# Patient Record
Sex: MALE | Race: WHITE | NOT HISPANIC OR LATINO | Employment: OTHER | URBAN - NONMETROPOLITAN AREA
[De-identification: names, ages, dates, MRNs, and addresses within clinical notes are randomized per-mention and may not be internally consistent; named-entity substitution may affect disease eponyms.]

---

## 2018-08-03 ENCOUNTER — HOSPITAL ENCOUNTER (EMERGENCY)
Facility: OTHER | Age: 28
Discharge: HOME | End: 2018-08-03
Attending: EMERGENCY MEDICINE | Admitting: EMERGENCY MEDICINE
Payer: OTHER GOVERNMENT

## 2018-08-03 VITALS
RESPIRATION RATE: 26 BRPM | DIASTOLIC BLOOD PRESSURE: 82 MMHG | HEIGHT: 73 IN | OXYGEN SATURATION: 98 % | SYSTOLIC BLOOD PRESSURE: 117 MMHG | TEMPERATURE: 97.8 F

## 2018-08-03 DIAGNOSIS — R00.2 PALPITATIONS: ICD-10-CM

## 2018-08-03 DIAGNOSIS — K08.89 PAIN, DENTAL: ICD-10-CM

## 2018-08-03 PROCEDURE — 93010 ELECTROCARDIOGRAM REPORT: CPT | Performed by: INTERNAL MEDICINE

## 2018-08-03 PROCEDURE — 93005 ELECTROCARDIOGRAM TRACING: CPT | Performed by: EMERGENCY MEDICINE

## 2018-08-03 PROCEDURE — 99283 EMERGENCY DEPT VISIT LOW MDM: CPT | Mod: 25 | Performed by: EMERGENCY MEDICINE

## 2018-08-03 PROCEDURE — 99283 EMERGENCY DEPT VISIT LOW MDM: CPT | Mod: Z6 | Performed by: EMERGENCY MEDICINE

## 2018-08-03 RX ORDER — CLINDAMYCIN HCL 300 MG
300 CAPSULE ORAL 3 TIMES DAILY
Qty: 30 CAPSULE | Refills: 0 | Status: SHIPPED | OUTPATIENT
Start: 2018-08-03 | End: 2018-08-13

## 2018-08-03 ASSESSMENT — ENCOUNTER SYMPTOMS
DIARRHEA: 0
DIZZINESS: 0
NAUSEA: 0
HEADACHES: 0
ABDOMINAL PAIN: 0
FEVER: 0
WEAKNESS: 0
CHILLS: 0
PALPITATIONS: 1
SHORTNESS OF BREATH: 0
VOMITING: 0
DIAPHORESIS: 0
FATIGUE: 0
LIGHT-HEADEDNESS: 0

## 2018-08-03 NOTE — ED AVS SNAPSHOT
[facility]    [address]    [place] [address]    Phone:  [phone]    Fax:  [phone]                                       Anton Donovan   MRN: [id number]    Department:  [facility]   Date of Visit:  8/3/2018           Patient Information     Date Of Birth          1990        Your diagnoses for this visit were:     Palpitations     Pain, dental        You were seen by Quinn Wilson MD.        Discharge Instructions       1.  Take clindamycin as instructed  2.  Follow-up your own dentist either later today or Monday for further care and recommendation  3.  Take ibuprofen 600-800 mg 2-3 times a day as needed for pain  4.  You may also take Tylenol 1000 mg 3-4 times a day as needed for pain    24 Hour Appointment Hotline     To schedule an appointment at [facility], please call [phone]. If you don't have a family doctor or clinic, we will help you find one. [facility] are conveniently located to serve the needs of you and your family.           Review of your medicines      START taking        Dose / Directions Last dose taken    clindamycin 300 MG capsule   Commonly known as:  CLEOCIN   Dose:  300 mg   Quantity:  30 capsule        Take 1 capsule (300 mg) by mouth 3 times daily for 10 days   Refills:  0          Our records show that you are taking the medicines listed below. If these are incorrect, please call your family doctor or clinic.        Dose / Directions Last dose taken    GABAPENTIN PO   Dose:  300 mg        Take 300 mg by mouth 3 times daily   Refills:  0                Prescriptions were sent or printed at these locations (1 Prescription)                   [id number] - [place], [place] - [address]   [address], [facility] [address]    Telephone:  [phone]   Fax:  [phone]   Hours:                  Printed at Department/Unit printer (1 of 1)          "clindamycin (CLEOCIN) 300 MG capsule                Procedures and tests performed during your visit     EKG 12 lead      Orders Needing Specimen Collection     None      Pending Results     No orders found from 2018 to 2018.            Pending Culture Results     No orders found from 2018 to 2018.            Pending Results Instructions     If you had any lab results that were not finalized at the time of your Discharge, you can call the ED Lab Result RN at [phone]. You will be contacted by this team for any positive Lab results or changes in treatment. The nurses are available 7 days a week from 10A to 6:30P.  You can leave a message 24 hours per day and they will return your call.        Thank you for choosing [facility]       Thank you for choosing [facility] for your care. Our goal is always to provide you with excellent care. Hearing back from our patients is one way we can continue to improve our services. Please take a few minutes to complete the written survey that you may receive in the mail after you visit with us. Thank you!        [facility] [employer] Information     [employer] lets you send messages to your doctor, view your test results, renew your prescriptions, schedule appointments and more. To sign up, go to [email or web address] . Click on \"Log in\" on the left side of the screen, which will take you to the Welcome page. Then click on \"Sign up Now\" on the right side of the page.     You will be asked to enter the access code listed below, as well as some personal information. Please follow the directions to create your username and password.     Your access code is: [email or web address]  Expires: 2018  9:17 AM     Your access code will  in 90 days. If you need help or a new code, please call your [facility] clinic or [phone].        Care EveryWhere ID     This is your Care EveryWhere ID. This could be used by other organizations to access your [facility] medical records  [id number]      "   Equal Access to Services     KATERINE DALTON : Eloy Sarmiento, janna mcginnis, ami bailey. So [facility] [phone].    ATENCIÓN: Si habla español, tiene a conde disposición servicios gratuitos de asistencia lingüística. Llame al [phone].    We comply with applicable federal civil rights laws and [place] laws. We do not discriminate on the basis of race, color, national origin, age, disability, sex, sexual orientation, or gender identity.            After Visit Summary       This is your record. Keep this with you and show to your community pharmacist(s) and doctor(s) at your next visit.

## 2018-08-03 NOTE — ED PROVIDER NOTES
"  History     Chief Complaint   Patient presents with     Dental Pain     Arm Pain     Palpitations     HPI Comments: Is a 27-year-old with history of one episode of rapid heart rate with poor ventricular rate of 220s with syncope in 2013 at work for which the patient was admitted to the hospital in [facility] for 5 days coming to the emergency room with complaint of 3 episodes of what he feels to have been a skipped beat of his heart in the past several days.  He said he has had skipped beats but the episodes was maybe once a year in the past.  He denied fatigue, rapid heart rate, dizziness/lightheadedness, faint sensation or loss of consciousness.  He has not had a shortness of breath or chest or abdominal pains.  Patient denies family history of sudden cardiac death or history of terminal arrhythmia.  He stated he was told he had that rapid heart rate episode in the [facility] that he had \"SVT\" and he was discharged home with propranolol.  He said he had taken it for a while and then stopped it because the prescription was not refilled and he was doing well and he did not pursue it any further.    Problem List:    There are no active problems to display for this patient.       Past Medical History:    No past medical history on file.    Past Surgical History:    No past surgical history on file.    Family History:    No family history on file.    Social History:  Marital Status:    Social History   Substance Use Topics     Smoking status: Not on file     Smokeless tobacco: Not on file     Alcohol use Not on file        Medications:      clindamycin (CLEOCIN) 300 MG capsule   GABAPENTIN PO         Review of Systems   Constitutional: Negative for chills, diaphoresis, fatigue and fever.   Respiratory: Negative for shortness of breath.    Cardiovascular: Positive for palpitations. Negative for chest pain.   Gastrointestinal: Negative for abdominal pain, diarrhea, nausea and vomiting.   Neurological: " "Negative for dizziness, weakness, light-headedness and headaches.   All other systems reviewed and are negative.      Physical Exam   BP: 117/82  Heart Rate: 73  Temp: 97.8  F (36.6  C)  Resp: 18  Height: 185.4 cm (6' 1\")  SpO2: 98 %      Physical Exam   Constitutional: He is oriented to person, place, and time. He appears well-developed and well-nourished. No distress.   Cardiovascular: Normal rate, regular rhythm, normal heart sounds and intact distal pulses.    Pulmonary/Chest: Effort normal and breath sounds normal. No respiratory distress. He has no wheezes. He has no rales. He exhibits no tenderness.   Abdominal: Soft. Bowel sounds are normal. He exhibits no distension. There is no tenderness. There is no rebound and no guarding.   Musculoskeletal: Normal range of motion. He exhibits no edema or tenderness.   Neurological: He is oriented to person, place, and time.   No focal neurologic findings on examining       ED Course     Gentleman reportedly had a significant rapid heart rate with reported ventricular response of 220s caught on Holter monitor which led to patient having syncope at work in 2013 is coming to the emergency room for having had 3 episodes of skipped beats in the past several days.  He has had skipped beats according to him but the episodes have been quite distant like once a year pattern.  He has no other concerns or complaints such as lightheadedness, dizziness, generalized weakness or fatigue diaphoresis, rapid heart rate or shortness of breath.  Patient told the nurse multiple and related complaints which I told him in my initial encounter that since this in the emergency room I want him to pick 1 or 2 main concerns at which point he said it is the palpitation that I am really concerned about.  He did mention he had some intermittent abnormal sensation at the medial aspect of the left arm whereby patient stated \"it feels as if my blood is running through a small vein in that area.\"  His " examination of the left upper extremity is completely unremarkable with good blood supply to the extremity edema.  EKG seen today in the emergency room reveals normal sinus rhythm with symmetric flipped T waves in inferior leads III and V1.  I do not have old EKG for comparison.  The EKG is otherwise completely unremarkable revealing no arrhythmia, AV block's, QT prolongation, Brugada pattern, delta wave, Q waves, signs of LVH/HCM or arrhythmogenic right ventricular dysplasia.  Feels reassured with a negative EKG and he is advised to return to ER as needed. Patient is complaining of chronic toothache involving left lower wisdom tooth.  He is getting erosion over the gum tissue lining over the tooth mostly covering it with some discharge there and significant tenderness.  No obvious abscess seen.  Patient is placed on clindamycin (he said he is allergic to penicillin) and he is advised to consult with dentist right away for definitive care.  In my judgment this was stim to his needs to be taken out.      ED Course     Procedures               Critical Care time:  none               No results found for this or any previous visit (from the past 24 hour(s)).    Medications - No data to display    Assessments & Plan (with Medical Decision Making)     I have reviewed the nursing notes.    I have reviewed the findings, diagnosis, plan and need for follow up with the patient.       New Prescriptions    CLINDAMYCIN (CLEOCIN) 300 MG CAPSULE    Take 1 capsule (300 mg) by mouth 3 times daily for 10 days       Final diagnoses:   Palpitations   Pain, dental       8/3/2018   [facility]  08/03/18 2494

## 2018-08-03 NOTE — DISCHARGE INSTRUCTIONS
1.  Take clindamycin as instructed  2.  Follow-up your own dentist either later today or Monday for further care and recommendation  3.  Take ibuprofen 600-800 mg 2-3 times a day as needed for pain  4.  You may also take Tylenol 1000 mg 3-4 times a day as needed for pain

## 2018-08-03 NOTE — ED TRIAGE NOTES
"Pt comes to the ER reporting left jaw pain from a tooth infection for a month. Also has left arm pain, \"when I stand up my arm pulses and it is painful in my left arm\" Pt reports his heart flutters and skips a beat, no chest pain, history of SVT in 2013, pt was treated in the ER for this then. This has been going on for a while.  Pt reports that the pain has been getting worse in his mouth, called Dr. Kelley, recommended another dental office, which no one answered, [facility], Pt is concerned about a fever and neck pain that started on Monday. Concerned that if [facility] that if he has issues with his heart and arm they'd had told him to come to the ER.   Pt thinks his dental infection may be related to his heart.   "

## 2018-08-03 NOTE — ED AVS SNAPSHOT
[facility]    [address]    [place] [address]    Phone:  [phone]    Fax:  [phone]                                       Anton Donovan   MRN: [id number]    Department:  [facility]   Date of Visit:  8/3/2018           After Visit Summary Signature Page     I have received my discharge instructions, and my questions have been answered. I have discussed any challenges I see with this plan with the nurse or doctor.    ..........................................................................................................................................  Patient/Patient Representative Signature      ..........................................................................................................................................  Patient Representative Print Name and Relationship to Patient    ..................................................               ................................................  Date                                            Time    ..........................................................................................................................................  Reviewed by Signature/Title    ...................................................              ..............................................  Date                                                            Time

## 2018-08-03 NOTE — ED TRIAGE NOTES
[place]BIA-SUICIDE SEVERITY RATING SCALE   Screen with Triage Points for Emergency Department      Ask questions that are bolded and underlined.   Past  month   Ask Questions 1 and 2 YES NO   1)  Have you wished you were dead or wished you could go to sleep and not wake up?   n   2)  Have you actually had any thoughts of killing yourself?   n   If YES to 2, ask questions 3, 4, 5, and 6.  If NO to 2, go directly to question 6.   3)  Have you been thinking about how you might do this?   E.g.  I thought about taking an overdose but I never made a specific plan as to when where or how I would actually do it .and I would never go through with it.       4)  Have you had these thoughts and had some intention of acting on them?   As opposed to  I have the thoughts but I definitely will not do anything about them.       5)  Have you started to work out or worked out the details of how to kill yourself? Do you intend to carry out this plan?      6)  Have you ever done anything, started to do anything, or prepared to do anything to end your life?  Examples: Collected pills, obtained a gun, gave away valuables, wrote a will or suicide note, took out pills but didn t swallow any, held a gun but changed your mind or it was grabbed from your hand, went to the roof but didn t jump; or actually took pills, tried to shoot yourself, cut yourself, tried to hang yourself, etc.  Was suicidal in 2013, no longer, did not have an attempt  If YES, ask: Was this within the past three months?  Lifetime     n    Past 3 Months     n   Item 1:  Behavioral Health Referral at Discharge  Item 2:  Behavioral Health Referral at Discharge   Item 3:  Behavioral Health Consult (Psychiatric Nurse/) and consider Patient Safety Precautions  Item 4:  Immediate Notification of Physician and/or Behavioral Health and Patient Safety Precautions   Item 5:  Immediate Notification of Physician and/or Behavioral Health and Patient Safety Precautions  Item  6:  Over 3 months ago: Behavioral Health Consult (Psychiatric Nurse/) and consider Patient Safety Precautions  OR  Item 6:  3 months ago or less: Immediate Notification of Physician and/or Behavioral Health and Patient Safety Precautions

## 2018-08-03 NOTE — ED TRIAGE NOTES
Pt reports that the doctor he saw recently didn't given him antibiotics for his tooth, even though he told them about it. They gave him nicotine patches.

## 2018-08-03 NOTE — ED TRIAGE NOTES
Pt is concerned with is prostate as well, has been having problems with having pain holding his urine.

## 2020-03-13 DIAGNOSIS — G47.33 OSA (OBSTRUCTIVE SLEEP APNEA): Primary | ICD-10-CM

## 2021-08-24 ENCOUNTER — OFFICE VISIT (OUTPATIENT)
Dept: SURGERY | Facility: OTHER | Age: 31
End: 2021-08-24
Attending: NURSE PRACTITIONER
Payer: COMMERCIAL

## 2021-08-24 ENCOUNTER — HOSPITAL ENCOUNTER (OUTPATIENT)
Dept: CT IMAGING | Facility: HOSPITAL | Age: 31
Discharge: HOME | End: 2021-08-24
Attending: NURSE PRACTITIONER | Admitting: NURSE PRACTITIONER
Payer: COMMERCIAL

## 2021-08-24 ENCOUNTER — PREP FOR PROCEDURE (OUTPATIENT)
Dept: SURGERY | Facility: OTHER | Age: 31
End: 2021-08-24

## 2021-08-24 VITALS
DIASTOLIC BLOOD PRESSURE: 80 MMHG | SYSTOLIC BLOOD PRESSURE: 118 MMHG | BODY MASS INDEX: 33.9 KG/M2 | RESPIRATION RATE: 16 BRPM | HEART RATE: 89 BPM | HEIGHT: 73 IN | TEMPERATURE: 98.4 F | OXYGEN SATURATION: 98 % | WEIGHT: 255.8 LBS

## 2021-08-24 DIAGNOSIS — L05.91 PILONIDAL CYST: ICD-10-CM

## 2021-08-24 DIAGNOSIS — Z01.818 PREOPERATIVE TESTING: ICD-10-CM

## 2021-08-24 DIAGNOSIS — K61.1 PERIRECTAL ABSCESS: Primary | ICD-10-CM

## 2021-08-24 DIAGNOSIS — K61.1 PERIRECTAL ABSCESS: ICD-10-CM

## 2021-08-24 DIAGNOSIS — K60.30 ANAL FISTULA: Primary | ICD-10-CM

## 2021-08-24 DIAGNOSIS — K60.40 PERIRECTAL FISTULA: ICD-10-CM

## 2021-08-24 PROCEDURE — 72193 CT PELVIS W/DYE: CPT

## 2021-08-24 PROCEDURE — 255N000002 HC RX 255 OP 636: Performed by: RADIOLOGY

## 2021-08-24 PROCEDURE — 99204 OFFICE O/P NEW MOD 45 MIN: CPT | Performed by: NURSE PRACTITIONER

## 2021-08-24 RX ORDER — IOPAMIDOL 612 MG/ML
100 INJECTION, SOLUTION INTRAVASCULAR ONCE
Status: COMPLETED | OUTPATIENT
Start: 2021-08-24 | End: 2021-08-24

## 2021-08-24 RX ORDER — CIPROFLOXACIN 500 MG/1
500 TABLET, FILM COATED ORAL 2 TIMES DAILY
Qty: 28 TABLET | Refills: 0 | Status: SHIPPED | OUTPATIENT
Start: 2021-08-24 | End: 2021-09-07

## 2021-08-24 RX ORDER — METRONIDAZOLE 500 MG/1
500 TABLET ORAL 3 TIMES DAILY
Qty: 42 TABLET | Refills: 0 | Status: SHIPPED | OUTPATIENT
Start: 2021-08-24 | End: 2021-09-07

## 2021-08-24 RX ADMIN — IOPAMIDOL 100 ML: 612 INJECTION, SOLUTION INTRAVENOUS at 12:41

## 2021-08-24 ASSESSMENT — MIFFLIN-ST. JEOR: SCORE: 2164.18

## 2021-08-24 ASSESSMENT — PAIN SCALES - GENERAL: PAINLEVEL: MODERATE PAIN (5)

## 2021-08-24 NOTE — NURSING NOTE
"Chief Complaint   Patient presents with     New Patient     [facility] - [facility] healing [facility]       Initial /80   Pulse 89   Temp 98.4  F (36.9  C) (Tympanic)   Resp 16   Ht 1.854 m (6' 1\")   Wt 116 kg (255 lb 12.8 oz)   SpO2 98%   BMI 33.75 kg/m   Estimated body mass index is 33.75 kg/m  as calculated from the following:    Height as of this encounter: 1.854 m (6' 1\").    Weight as of this encounter: 116 kg (255 lb 12.8 oz).  Medication Reconciliation: complete  Bhavya Ferrer LPN  "

## 2021-08-24 NOTE — PATIENT INSTRUCTIONS
Thank you for allowing Radha Duarte CNP and our surgical team to participate in your care. Please call our health unit coordinator at [phone] with scheduling questions or the nurse at [phone] with any other questions or concerns.    Thank you for allowing our surgical team to participate in your care. Please review the instructions below.   If you have questions, you may contact us at the any of the following numbers:     [facility] Health Unit Coordinator: [phone]  Clinic Surgery Nurse ([place]): [phone]  Hospital Surgery Education Nurse: [phone]    You are scheduled for: Exam under anesthesia and anal fistulatomy with Dr. JUDY Cruz on 9/7/2021.  Admit Time: Hospital Surgery will call you the day before your procedure by 5pm with your arrival time.   If your surgery is on Monday, expect a call on Friday.   If you are not contacted before 5PM, please call admitting at [phone].   After hours or on weekends, please call [phone] to postpone.   Call the clinic surgery nurse if you become ill within 2 weeks of your procedure to reschedule.   This includes fever, chills, sore throat, cough, chest congestion, runny nose, or any other symptom of any other illness.     COVID-19 test is needed 4-5 days before procedure. This testing is done at the upper level of the [facility] (weekdays and weekends-East Entrance) or at the [facility] (weekday mornings only).  Follow the signage for parking and bring your mobile phone (if you have one) to call the phone number ([phone]) on the sign outside the testing site for check-in. Stay in your vehicle until you are directed to enter. If you do not have a cell phone, please call the nurse for instructions on checking in. This has been scheduled for 9/2/2021 at 8:15 at the [facility] site.      Please call the Hospital Surgery Education Nurse at [phone] 1 week prior to your procedure and have a medication list  ready.   Do not take Aspirin or other NSAIDS (Ibuprofen, Motrin, Aleve, Celebrex, Naproxen, etc), vitamins/supplements 7 days before your surgery unless you have been otherwise directed.   If you are prescribed a daily 81mg Aspirin, you may continue this.   If you are prescribed blood thinners or insulin, please contact your primary care provider for instructions.    Do not have anything to eat or drink after midnight the night before your surgery (or 8 hours prior to surgery), except clear liquids (water, clear juice, clear broth, plain coffee or tea without cream or milk) up until 2 hours prior to arrival time, and then nothing by mouth.   Do not eat, drink, chew gum, suck on hard candy, or smoke after 2 hours prior to arrival. You can brush your teeth.   If you are directed to take any medications, take them with a tiny sip of water.   If you use an inhaler, bring it with you.  Arrive in clean, comfortable clothing.   Do not wear any jewelry, make-up, nail polish, lotions, hair products, or perfumes.   [place] in hospital admitting through the [facility].  A responsible adult must be available to drive you home and stay with you for 24 hours at home. If you need to take a taxi or the bus, you must have another responsible adult to ride with you. Your procedure will be cancelled if you do not have a responsible adult .     Return to clinic for a postop appointment 1 week(s) from your surgery date.

## 2021-08-24 NOTE — PROGRESS NOTES
"CLINIC NOTE - CONSULT  8/24/2021    Patient:Anton Donovan    Referring Physician[facility]    Reason for Referral: Perirectal Abscess    This is a 30 year old male with couple mouth history of pain and itching to the perirectal region. He has a history of a perirectal abscess.  He is able to the pop the area and have it drain intermittently.  He was on doxycycline for the area.    Past Medical History:  No past medical history on file.    Past Surgical History:  No past surgical history on file.    Family History History:  No family history on file.    History of Tobacco Use:  History   Smoking Status     Current Every Day Smoker     Types: Cigarettes   Smokeless Tobacco     Not on file       Current Medications:  Current Outpatient Medications   Medication Sig Dispense Refill     ARIPiprazole ER (ABILIFY MAINTENA) 300 MG extended release injection Inject 300 mg into the muscle every 28 days       ciprofloxacin (CIPRO) 500 MG tablet Take 1 tablet (500 mg) by mouth 2 times daily for 14 days 28 tablet 0     metroNIDAZOLE (FLAGYL) 500 MG tablet Take 1 tablet (500 mg) by mouth 3 times daily for 14 days 42 tablet 0     sertraline (ZOLOFT) 50 MG tablet Take 50 mg by mouth daily         Allergies:  Allergies   Allergen Reactions     Penicillins Unknown       ROS:  Constitutional: negative  Eyes: negative  Ears, nose, mouth, throat, and face: negative  Respiratory: negative  Cardiovascular: negative  Gastrointestinal: negative  Genitourinary:negative  Integument/breast: positive for wound to right coccyx; pain to perirectal area  Hematologic/lymphatic: negative  Musculoskeletal: back pain  Neurological: history of TBI and migraines  Behvioral/Psych: PTSD, bipolar; tobacco use, marijuana use  Endocrine: negative  Allergic/Immunologic: negative    PHYSICAL EXAM:     Vital signs: /80   Pulse 89   Temp 98.4  F (36.9  C) (Tympanic)   Resp 16   Ht 1.854 m (6' 1\")   Wt 116 kg (255 lb 12.8 oz)   SpO2 " 98%   BMI 33.75 kg/m     BMI: Body mass index is 33.75 kg/m .   General: Normal, healthy, cooperative, in no acute distress, alert   Skin: pain to palpation noted to perirectal region between the rectum and testicles; pilonidal cyst noted to right coccyx   Lungs: clear to auscultation   CV: Regular rate and rhythm   Abdominal: non-distended, soft, non-tender to palpation   Extremities: No cyanosis, clubbing or edema noted bilaterally in Upper and Lower Extremities   Neurological: without deficit   Rectal: There are pits present in the intergluteal cleft.  There is not swelling and/or erythema. There is drainage.    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 Kale Cruz was consulted on patient and examined the patient.  A CT scan of the patient's pelvis was ordered to assess the patient's symptoms further.  The CT scan was normal.  Will start the patient on Cipro and Flagyl for his symptoms. Will schedule the patient for rectal examine under anesthesia for his symptoms on 9/7/21.  He is to be on the Cipro/Flagyl until 9/7/21. He was instructed to take a daily probiotic while on the antibiotics and to not drink any alcohol while taking the flagyl.    The risks, benefits, and alternatives to the planned procedure were fully discussed with the patient and/or the patient's representative(s). The risks of bleeding, infection, death, missing pathology, the need for additional procedures intra-operatively, the possible need for intra-operative consults, the possible need for transfusion therapy, cardiopulmonary compromise, the possible need for additional surgery for a complication were discussed with the patient and/or the patient's representative(s). The patient's and/or patient's representative(s) questions were addressed and answered. Informed consent was obtained from the patient and/or the patient's representative(s). The  patient and/or the patient's representative(s) consent to proceed.

## 2021-08-30 ENCOUNTER — ANESTHESIA EVENT (OUTPATIENT)
Dept: SURGERY | Facility: HOSPITAL | Age: 31
End: 2021-08-30
Payer: COMMERCIAL

## 2021-08-30 ASSESSMENT — LIFESTYLE VARIABLES: TOBACCO_USE: 1

## 2021-08-30 NOTE — ANESTHESIA PREPROCEDURE EVALUATION
Anesthesia Pre-Procedure Evaluation    Patient: Anton Donovan   MRN: [id number] : 1990        Preoperative Diagnosis: Anal fistula [K60.3]   Procedure : Procedure(s):  EXAM UNDER ANESTHESIA, RECTUM, Fistulotomy     No past medical history on file.   No past surgical history on file.   Allergies   Allergen Reactions     Penicillins Unknown      Social History     Tobacco Use     Smoking status: Current Every Day Smoker     Types: Cigarettes   Substance Use Topics     Alcohol use: Not on file      Wt Readings from Last 1 Encounters:   21 116 kg (255 lb 12.8 oz)        Anesthesia Evaluation   Pt has not had prior anesthetic         ROS/MED HX  ENT/Pulmonary:     (+) tobacco use, Current use, 2 packs/day,     Neurologic:  - neg neurologic ROS     Cardiovascular:  - neg cardiovascular ROS   (+) -----Previous cardiac testing   Echo: Date: Results:    Stress Test: Date: Results:    ECG Reviewed: Date: 18 Results:  NSR  Cath: Date: Results:      METS/Exercise Tolerance:     Hematologic:  - neg hematologic  ROS     Musculoskeletal:  - neg musculoskeletal ROS     GI/Hepatic: Comment: Anal fistula - neg GI/hepatic ROS     Renal/Genitourinary:  - neg Renal ROS     Endo:     (+) Obesity,     Psychiatric/Substance Use: Comment: PTSD - neg psychiatric ROS   (+) Recreational drug usage: Cannabis (uses daily, smoked at 4am today).    Infectious Disease:  - neg infectious disease ROS     Malignancy:  - neg malignancy ROS     Other: Comment: PCN allergy, unspecified reaction           Physical Exam    Airway        Mallampati: II   TM distance: > 3 FB   Neck ROM: full   Mouth opening: > 3 cm    Respiratory Devices and Support         Dental  no notable dental history       B=Bridge, C=Chipped, L=Loose, M=Missing    Cardiovascular   cardiovascular exam normal       Rhythm and rate: regular and normal     Pulmonary   pulmonary exam normal        breath sounds clear to auscultation           OUTSIDE LABS:  CBC: No  results found for: WBC, HGB, HCT, PLT  BMP: No results found for: NA, POTASSIUM, CHLORIDE, CO2, BUN, CR, GLC  COAGS: No results found for: PTT, INR, FIBR  POC: No results found for: BGM, HCG, HCGS  HEPATIC: No results found for: ALBUMIN, PROTTOTAL, ALT, AST, GGT, ALKPHOS, BILITOTAL, BILIDIRECT, OSIRIS  OTHER: No results found for: PH, LACT, A1C, ALMA, PHOS, MAG, LIPASE, AMYLASE, TSH, T4, T3, CRP, SED    Anesthesia Plan    ASA Status:  2   NPO Status:  NPO Appropriate    Anesthesia Type: General.     - Airway: LMA   Induction: Propofol.   Maintenance: Balanced.        Consents    Anesthesia Plan(s) and associated risks, benefits, and realistic alternatives discussed. Questions answered and patient/representative(s) expressed understanding.     - Discussed with:  Patient      - Extended Intubation/Ventilatory Support Discussed: Yes.      - Patient is DNR/DNI Status: No    Use of blood products discussed: No .     Postoperative Care    Pain management: IV analgesics, Oral pain medications, Multi-modal analgesia.   PONV prophylaxis: Background Propofol Infusion, Ondansetron (or other 5HT-3)     Comments:    Position high lithotomy, very nervous            ROSANNA Michel CRNA

## 2021-09-03 ENCOUNTER — OFFICE VISIT (OUTPATIENT)
Dept: FAMILY MEDICINE | Facility: OTHER | Age: 31
End: 2021-09-03
Attending: OBSTETRICS & GYNECOLOGY
Payer: COMMERCIAL

## 2021-09-03 DIAGNOSIS — Z01.818 PREOPERATIVE TESTING: ICD-10-CM

## 2021-09-03 PROCEDURE — U0005 INFEC AGEN DETEC AMPLI PROBE: HCPCS

## 2021-09-03 PROCEDURE — U0003 INFECTIOUS AGENT DETECTION BY NUCLEIC ACID (DNA OR RNA); SEVERE ACUTE RESPIRATORY SYNDROME CORONAVIRUS 2 (SARS-COV-2) (CORONAVIRUS DISEASE [COVID-19]), AMPLIFIED PROBE TECHNIQUE, MAKING USE OF HIGH THROUGHPUT TECHNOLOGIES AS DESCRIBED BY CMS-2020-01-R: HCPCS

## 2021-09-04 LAB — SARS-COV-2 RNA RESP QL NAA+PROBE: NEGATIVE

## 2021-09-07 ENCOUNTER — ANESTHESIA (OUTPATIENT)
Dept: SURGERY | Facility: HOSPITAL | Age: 31
End: 2021-09-07
Payer: COMMERCIAL

## 2021-09-07 ENCOUNTER — HOSPITAL ENCOUNTER (OUTPATIENT)
Facility: HOSPITAL | Age: 31
Discharge: HOME | End: 2021-09-07
Attending: SURGERY | Admitting: SURGERY
Payer: COMMERCIAL

## 2021-09-07 VITALS
OXYGEN SATURATION: 94 % | RESPIRATION RATE: 16 BRPM | HEART RATE: 74 BPM | DIASTOLIC BLOOD PRESSURE: 80 MMHG | SYSTOLIC BLOOD PRESSURE: 128 MMHG | TEMPERATURE: 97 F | HEIGHT: 73 IN | WEIGHT: 260 LBS | BODY MASS INDEX: 34.46 KG/M2

## 2021-09-07 DIAGNOSIS — K60.30 ANAL FISTULA: ICD-10-CM

## 2021-09-07 PROCEDURE — 46050 I&D PERIANAL ABSCESS SUPFC: CPT | Performed by: NURSE ANESTHETIST, CERTIFIED REGISTERED

## 2021-09-07 PROCEDURE — 250N000011 HC RX IP 250 OP 636: Performed by: NURSE ANESTHETIST, CERTIFIED REGISTERED

## 2021-09-07 PROCEDURE — 10061 I&D ABSCESS COMP/MULTIPLE: CPT | Performed by: SURGERY

## 2021-09-07 PROCEDURE — 250N000011 HC RX IP 250 OP 636: Performed by: SURGERY

## 2021-09-07 PROCEDURE — 250N000009 HC RX 250: Performed by: SURGERY

## 2021-09-07 PROCEDURE — 370N000017 HC ANESTHESIA TECHNICAL FEE, PER MIN: Performed by: SURGERY

## 2021-09-07 PROCEDURE — 710N000012 HC RECOVERY PHASE 2, PER MINUTE: Performed by: SURGERY

## 2021-09-07 PROCEDURE — 250N000025 HC SEVOFLURANE, PER MIN: Performed by: SURGERY

## 2021-09-07 PROCEDURE — 250N000009 HC RX 250: Performed by: NURSE ANESTHETIST, CERTIFIED REGISTERED

## 2021-09-07 PROCEDURE — 258N000003 HC RX IP 258 OP 636: Performed by: SURGERY

## 2021-09-07 PROCEDURE — 999N000141 HC STATISTIC PRE-PROCEDURE NURSING ASSESSMENT: Performed by: SURGERY

## 2021-09-07 PROCEDURE — 710N000010 HC RECOVERY PHASE 1, LEVEL 2, PER MIN: Performed by: SURGERY

## 2021-09-07 PROCEDURE — 250N000013 HC RX MED GY IP 250 OP 250 PS 637: Performed by: NURSE ANESTHETIST, CERTIFIED REGISTERED

## 2021-09-07 PROCEDURE — 258N000003 HC RX IP 258 OP 636: Performed by: NURSE ANESTHETIST, CERTIFIED REGISTERED

## 2021-09-07 PROCEDURE — 360N000074 HC SURGERY LEVEL 1, PER MIN: Performed by: SURGERY

## 2021-09-07 PROCEDURE — 250N000013 HC RX MED GY IP 250 OP 250 PS 637: Performed by: SURGERY

## 2021-09-07 PROCEDURE — 272N000001 HC OR GENERAL SUPPLY STERILE: Performed by: SURGERY

## 2021-09-07 RX ORDER — DEXAMETHASONE SODIUM PHOSPHATE 10 MG/ML
INJECTION, SOLUTION INTRAMUSCULAR; INTRAVENOUS PRN
Status: DISCONTINUED | OUTPATIENT
Start: 2021-09-07 | End: 2021-09-07

## 2021-09-07 RX ORDER — LIDOCAINE HYDROCHLORIDE 20 MG/ML
INJECTION, SOLUTION INFILTRATION; PERINEURAL PRN
Status: DISCONTINUED | OUTPATIENT
Start: 2021-09-07 | End: 2021-09-07

## 2021-09-07 RX ORDER — KETOROLAC TROMETHAMINE 30 MG/ML
INJECTION, SOLUTION INTRAMUSCULAR; INTRAVENOUS PRN
Status: DISCONTINUED | OUTPATIENT
Start: 2021-09-07 | End: 2021-09-07

## 2021-09-07 RX ORDER — PROPOFOL 10 MG/ML
INJECTION, EMULSION INTRAVENOUS PRN
Status: DISCONTINUED | OUTPATIENT
Start: 2021-09-07 | End: 2021-09-07

## 2021-09-07 RX ORDER — LIDOCAINE 40 MG/G
CREAM TOPICAL
Status: DISCONTINUED | OUTPATIENT
Start: 2021-09-07 | End: 2021-09-07 | Stop reason: HOSPADM

## 2021-09-07 RX ORDER — SODIUM CHLORIDE, SODIUM LACTATE, POTASSIUM CHLORIDE, CALCIUM CHLORIDE 600; 310; 30; 20 MG/100ML; MG/100ML; MG/100ML; MG/100ML
INJECTION, SOLUTION INTRAVENOUS CONTINUOUS
Status: DISCONTINUED | OUTPATIENT
Start: 2021-09-07 | End: 2021-09-07 | Stop reason: HOSPADM

## 2021-09-07 RX ORDER — CLINDAMYCIN PHOSPHATE 900 MG/50ML
900 INJECTION, SOLUTION INTRAVENOUS SEE ADMIN INSTRUCTIONS
Status: DISCONTINUED | OUTPATIENT
Start: 2021-09-07 | End: 2021-09-07 | Stop reason: HOSPADM

## 2021-09-07 RX ORDER — NALOXONE HYDROCHLORIDE 0.4 MG/ML
0.2 INJECTION, SOLUTION INTRAMUSCULAR; INTRAVENOUS; SUBCUTANEOUS
Status: DISCONTINUED | OUTPATIENT
Start: 2021-09-07 | End: 2021-09-07 | Stop reason: HOSPADM

## 2021-09-07 RX ORDER — CLINDAMYCIN PHOSPHATE 900 MG/50ML
900 INJECTION, SOLUTION INTRAVENOUS
Status: DISCONTINUED | OUTPATIENT
Start: 2021-09-07 | End: 2021-09-07 | Stop reason: HOSPADM

## 2021-09-07 RX ORDER — FENTANYL CITRATE 50 UG/ML
INJECTION, SOLUTION INTRAMUSCULAR; INTRAVENOUS PRN
Status: DISCONTINUED | OUTPATIENT
Start: 2021-09-07 | End: 2021-09-07

## 2021-09-07 RX ORDER — FENTANYL CITRATE 50 UG/ML
50 INJECTION, SOLUTION INTRAMUSCULAR; INTRAVENOUS
Status: DISCONTINUED | OUTPATIENT
Start: 2021-09-07 | End: 2021-09-07 | Stop reason: HOSPADM

## 2021-09-07 RX ORDER — HYDROCODONE BITARTRATE AND ACETAMINOPHEN 5; 325 MG/1; MG/1
1 TABLET ORAL EVERY 4 HOURS PRN
Qty: 18 TABLET | Refills: 0 | Status: SHIPPED | OUTPATIENT
Start: 2021-09-07 | End: 2021-09-10

## 2021-09-07 RX ORDER — FENTANYL CITRATE 50 UG/ML
50 INJECTION, SOLUTION INTRAMUSCULAR; INTRAVENOUS EVERY 5 MIN PRN
Status: DISCONTINUED | OUTPATIENT
Start: 2021-09-07 | End: 2021-09-07 | Stop reason: HOSPADM

## 2021-09-07 RX ORDER — ONDANSETRON 2 MG/ML
INJECTION INTRAMUSCULAR; INTRAVENOUS PRN
Status: DISCONTINUED | OUTPATIENT
Start: 2021-09-07 | End: 2021-09-07

## 2021-09-07 RX ORDER — NALOXONE HYDROCHLORIDE 0.4 MG/ML
0.4 INJECTION, SOLUTION INTRAMUSCULAR; INTRAVENOUS; SUBCUTANEOUS
Status: DISCONTINUED | OUTPATIENT
Start: 2021-09-07 | End: 2021-09-07 | Stop reason: HOSPADM

## 2021-09-07 RX ORDER — ONDANSETRON 2 MG/ML
4 INJECTION INTRAMUSCULAR; INTRAVENOUS EVERY 30 MIN PRN
Status: DISCONTINUED | OUTPATIENT
Start: 2021-09-07 | End: 2021-09-07 | Stop reason: HOSPADM

## 2021-09-07 RX ORDER — OXYCODONE HYDROCHLORIDE 5 MG/1
5 TABLET ORAL EVERY 4 HOURS PRN
Status: DISCONTINUED | OUTPATIENT
Start: 2021-09-07 | End: 2021-09-07 | Stop reason: HOSPADM

## 2021-09-07 RX ORDER — BUPIVACAINE HYDROCHLORIDE 5 MG/ML
INJECTION, SOLUTION PERINEURAL PRN
Status: DISCONTINUED | OUTPATIENT
Start: 2021-09-07 | End: 2021-09-07 | Stop reason: HOSPADM

## 2021-09-07 RX ORDER — ONDANSETRON 4 MG/1
4 TABLET, ORALLY DISINTEGRATING ORAL EVERY 30 MIN PRN
Status: DISCONTINUED | OUTPATIENT
Start: 2021-09-07 | End: 2021-09-07 | Stop reason: HOSPADM

## 2021-09-07 RX ADMIN — SODIUM PHOSPHATE, DIBASIC AND SODIUM PHOSPHATE, MONOBASIC 1 ENEMA: 7; 19 ENEMA RECTAL at 10:06

## 2021-09-07 RX ADMIN — GENTAMICIN SULFATE 480 MG: 40 INJECTION, SOLUTION INTRAMUSCULAR; INTRAVENOUS at 11:28

## 2021-09-07 RX ADMIN — FENTANYL CITRATE 50 MCG: 50 INJECTION, SOLUTION INTRAMUSCULAR; INTRAVENOUS at 11:38

## 2021-09-07 RX ADMIN — CLINDAMYCIN PHOSPHATE 900 MG: 900 INJECTION, SOLUTION INTRAVENOUS at 11:07

## 2021-09-07 RX ADMIN — FENTANYL CITRATE 50 MCG: 50 INJECTION, SOLUTION INTRAMUSCULAR; INTRAVENOUS at 12:10

## 2021-09-07 RX ADMIN — ONDANSETRON 4 MG: 2 INJECTION INTRAMUSCULAR; INTRAVENOUS at 11:31

## 2021-09-07 RX ADMIN — OXYCODONE HYDROCHLORIDE 5 MG: 5 TABLET ORAL at 13:03

## 2021-09-07 RX ADMIN — FENTANYL CITRATE 100 MCG: 50 INJECTION, SOLUTION INTRAMUSCULAR; INTRAVENOUS at 11:17

## 2021-09-07 RX ADMIN — FENTANYL CITRATE 50 MCG: 50 INJECTION, SOLUTION INTRAMUSCULAR; INTRAVENOUS at 11:31

## 2021-09-07 RX ADMIN — LIDOCAINE HYDROCHLORIDE 40 MG: 20 INJECTION, SOLUTION INFILTRATION; PERINEURAL at 11:17

## 2021-09-07 RX ADMIN — MIDAZOLAM 2 MG: 1 INJECTION INTRAMUSCULAR; INTRAVENOUS at 11:14

## 2021-09-07 RX ADMIN — KETOROLAC TROMETHAMINE 30 MG: 30 INJECTION, SOLUTION INTRAMUSCULAR at 11:31

## 2021-09-07 RX ADMIN — PROPOFOL 300 MG: 10 INJECTION, EMULSION INTRAVENOUS at 11:17

## 2021-09-07 RX ADMIN — SODIUM CHLORIDE, POTASSIUM CHLORIDE, SODIUM LACTATE AND CALCIUM CHLORIDE: 600; 310; 30; 20 INJECTION, SOLUTION INTRAVENOUS at 10:06

## 2021-09-07 RX ADMIN — DEXAMETHASONE SODIUM PHOSPHATE 6 MG: 10 INJECTION, SOLUTION INTRAMUSCULAR; INTRAVENOUS at 11:31

## 2021-09-07 ASSESSMENT — MIFFLIN-ST. JEOR: SCORE: 2178.23

## 2021-09-07 NOTE — DISCHARGE INSTRUCTIONS
Post-Anesthesia Patient Instructions    IMMEDIATELY FOLLOWING SURGERY:  Do not drive or operate machinery for the first twenty four hours after surgery.  Do not make any important decisions for twenty four hours after surgery or while taking narcotic pain medications or sedatives.  If you develop intractable nausea and vomiting or a severe headache please notify your doctor immediately.    FOLLOW-UP:  Please make an appointment with your surgeon as instructed. You do not need to follow up with anesthesia unless specifically instructed to do so.    WOUND CARE INSTRUCTIONS (if applicable):  Keep a dry clean dressing on the anesthesia/puncture wound site if there is drainage.  Once the wound has quit draining you may leave it open to air.  Generally you should leave the bandage intact for twenty four hours unless there is drainage.  If the epidural site drains for more than 36-48 hours please call the anesthesia department.    QUESTIONS?:  Please feel free to call your physician or the hospital  if you have any questions, and they will be happy to assist you.

## 2021-09-07 NOTE — ANESTHESIA PROCEDURE NOTES
Airway       Patient location during procedure: OR  Staff -        CRNA: Rosy Rich APRN CRNA       Performed By: CRNAIndications and Patient Condition       Indications for airway management: nanette-procedural         Mask difficulty assessment: 0 - not attempted    Final Airway Details       Final airway type: supraglottic airway    Supraglottic Airway Details        : 5 IGEL.    Post intubation assessment        Placement verified by: capnometry and equal breath sounds        Number of attempts at approach: 1       Secured with: plastic tape       Ease of procedure: easy       Dentition: Intact

## 2021-09-07 NOTE — OR NURSING
Patient and responsible adult given discharge instructions with no questions regarding instructions. Imelda score 18. Pain level 4/10.  Discharged from unit via ambulation. Patient discharged to home accompanied by significant other Nelda.

## 2021-09-07 NOTE — OP NOTE
REPORT OF OPERATION  DATE OF PROCEDURE: 9/7/2021    PATIENT: Anton Donovan    SURGERY PERFORMED: Exam under anesthesia and incision and drainage of perineal abscess    PREOPERATIVE DIAGNOSIS: Perineal abscess questionable anal fistula    POSTOPERATIVE DIAGNOSIS: Perineal abscess, no evidence of perianal fistula    SURGEON: Kale Cruz MD    ASSISTANTS: None    ANESTHESIA: General Endotracheal Anesthesia    COMPLICATIONS: None apparent    TRANSFUSIONS: None    TISSUE TO PATHOLOGY: None    FINDINGS: Small abscess cavity in the hernial region.  He did not tract to the anus.    INDICATIONS: This is a 31 year old male with recurrent abscess in the perineal area.  He also has a history of pilonidal disease.  Patient will be taken the operating room for exam under anesthesia and determining if the perineal abscess is a perianal fistula.    DESCRIPTIONS OF PROCEDURE IN DETAIL: After consent was obtained the patient was taken to the operative suite and cruz in the supine position.  The patient was identified and the correct patient was confirmed.  General Endotracheal Anesthesia was administered by anesthesia.  The patient was then placed in high lithotomy.  All pressure points were checked.  The patient was sterilely prepped and draped in the usual fashion.  A time out was performed verifying the correct patient and the correct procedure.  The entire operative team was in agreement.  All necessary equipment and supplies were in the room.    On initial investigation there was a small open wound in the perineal region.  Probing of the wound it did not tract towards the anus.  The wound was then opened and the base was thoroughly cauterized.  Again no evidence of tracking towards the anus was noted.  Anal exam was performed and no evidence of a track from the anus towards the abscess cavity was noted.  Seizure was then halted.  Sterile dressings were applied.  The patient was then awakened in the operating room  extubated difficulty and taken to the recovery room in stable condition on the procedure well.

## 2021-09-07 NOTE — ANESTHESIA POSTPROCEDURE EVALUATION
Patient: Anton Donovan    Procedure(s):  EXAM UNDER ANESTHESIA, [facility] of [facility]    Diagnosis:Anal fistula [K60.3]  Diagnosis Additional Information: No value filed.    Anesthesia Type:  General    Note:  Disposition: Outpatient   Postop Pain Control: Uneventful            Sign Out: Well controlled pain   PONV: No   Neuro/Psych: Uneventful            Sign Out: Acceptable/Baseline neuro status   Airway/Respiratory: Uneventful            Sign Out: Acceptable/Baseline resp. status   CV/Hemodynamics: Uneventful            Sign Out: Acceptable CV status; No obvious hypovolemia; No obvious fluid overload   Other NRE: NONE   DID A NON-ROUTINE EVENT OCCUR? No           Last vitals:  Vitals Value Taken Time   /82 09/07/21 1235   Temp 97  F (36.1  C) 09/07/21 1152   Pulse 75 09/07/21 1236   Resp 19 09/07/21 1236   SpO2 95 % 09/07/21 1238   Vitals shown include unvalidated device data.    Electronically Signed By: ROSANNA Ivy CRNA  September 7, 2021  12:45 PM

## 2021-09-07 NOTE — ANESTHESIA CARE TRANSFER NOTE
Patient: Anton Donovan    Procedure(s):  [facility]    Diagnosis: Anal fistula [K60.3]  Diagnosis Additional Information: No value filed.    Anesthesia Type:   General     Note:    Oropharynx: oropharynx clear of all foreign objects  Level of Consciousness: awake  Oxygen Supplementation: nasal cannula  Level of Supplemental Oxygen (L/min / FiO2): 2  Independent Airway: airway patency satisfactory and stable  Dentition: dentition unchanged  Vital Signs Stable: post-procedure vital signs reviewed and stable  Report to RN Given: handoff report given  Patient transferred to: PACU    Handoff Report: Identifed the Patient, Identified the Reponsible Provider, Reviewed the pertinent medical history, Discussed the surgical course, Reviewed Intra-OP anesthesia mangement and issues during anesthesia, Set expectations for post-procedure period and Allowed opportunity for questions and acknowledgement of understanding      Vitals:  Vitals Value Taken Time   /83 09/07/21 1152   Temp     Pulse 82 09/07/21 1153   Resp 7 09/07/21 1153   SpO2 98 % 09/07/21 1153   Vitals shown include unvalidated device data.    Electronically Signed By: ROSANNA Ivy CRNA  September 7, 2021  11:53 AM

## 2021-09-08 ENCOUNTER — OFFICE VISIT (OUTPATIENT)
Dept: SURGERY | Facility: OTHER | Age: 31
End: 2021-09-08
Attending: SURGERY
Payer: COMMERCIAL

## 2021-09-08 VITALS
HEART RATE: 84 BPM | BODY MASS INDEX: 34.46 KG/M2 | RESPIRATION RATE: 16 BRPM | SYSTOLIC BLOOD PRESSURE: 124 MMHG | HEIGHT: 73 IN | OXYGEN SATURATION: 96 % | DIASTOLIC BLOOD PRESSURE: 82 MMHG | TEMPERATURE: 98.4 F | WEIGHT: 260 LBS

## 2021-09-08 DIAGNOSIS — L02.215 PERINEAL ABSCESS: Primary | ICD-10-CM

## 2021-09-08 PROCEDURE — 99024 POSTOP FOLLOW-UP VISIT: CPT | Performed by: SURGERY

## 2021-09-08 ASSESSMENT — MIFFLIN-ST. JEOR: SCORE: 2178.23

## 2021-09-08 ASSESSMENT — PAIN SCALES - GENERAL: PAINLEVEL: SEVERE PAIN (6)

## 2021-09-08 NOTE — NURSING NOTE
"Chief Complaint   Patient presents with     Surgical Followup     wound repacking, [id number], post op 9/7/21       Initial /82   Pulse 84   Temp 98.4  F (36.9  C) (Tympanic)   Resp 16   Ht 1.854 m (6' 1\")   Wt 117.9 kg (260 lb)   SpO2 96%   BMI 34.30 kg/m   Estimated body mass index is 34.3 kg/m  as calculated from the following:    Height as of this encounter: 1.854 m (6' 1\").    Weight as of this encounter: 117.9 kg (260 lb).  Medication Reconciliation: complete  Bhavya Ferrer LPN  "

## 2021-09-08 NOTE — PROGRESS NOTES
"CLINIC NOTE - FOLLOW UP  9/8/2021    Patient:Anton Donovan    Reason for Visit: Follow up from recent surgery for exam under anesthesia and incision and drainage of perineal abscess    This is a 31 year old male here for follow up from a recent surgery for an exam under anesthesia and incision and drainage of perineal abscess. His prior medical records were reviewed.       His procedure was done yesterday he is here for follow-up and wound care training.    Current Medications:  Current Outpatient Medications   Medication Sig Dispense Refill     ARIPiprazole ER (ABILIFY MAINTENA) 300 MG extended release injection Inject 300 mg into the muscle every 28 days       HYDROcodone-acetaminophen (NORCO) 5-325 MG tablet Take 1 tablet by mouth every 4 hours as needed 18 tablet 0     sertraline (ZOLOFT) 50 MG tablet Take 50 mg by mouth daily         Allergies:  Allergies   Allergen Reactions     Penicillins Unknown       PHYSICAL EXAM:     Vital signs: /82   Pulse 84   Temp 98.4  F (36.9  C) (Tympanic)   Resp 16   Ht 1.854 m (6' 1\")   Wt 117.9 kg (260 lb)   SpO2 96%   BMI 34.30 kg/m     Weight: [unfilled]   BMI: Body mass index is 34.3 kg/m .   General: Normal, healthy, cooperative, in no acute distress   Skin: no jaundice   HEENT: PERRLA and EOMI   Neck: supple     In his perineal area there is evidence of an incision and drainage.  It is clean without erythema or induration.    ASSESSMENT:  31 year old male here as follow up from a recent surgery for an exam under anesthesia and incision and drainage of perineal abscess.    PLAN: We will initiate twice daily wet-to-dry dressings with normal saline.  He will follow-up in 1 week.      "

## 2021-09-08 NOTE — PATIENT INSTRUCTIONS
Thank you for allowing Dr. Cruz and our surgical team to participate in your care. Please call our health unit coordinator at [phone] with scheduling questions or the nurse at [phone] with any other questions or concerns.

## 2021-09-14 ENCOUNTER — OFFICE VISIT (OUTPATIENT)
Dept: SURGERY | Facility: OTHER | Age: 31
End: 2021-09-14
Attending: SURGERY
Payer: COMMERCIAL

## 2021-09-14 VITALS
DIASTOLIC BLOOD PRESSURE: 86 MMHG | HEART RATE: 117 BPM | OXYGEN SATURATION: 98 % | TEMPERATURE: 98.2 F | RESPIRATION RATE: 16 BRPM | HEIGHT: 73 IN | WEIGHT: 266.6 LBS | BODY MASS INDEX: 35.33 KG/M2 | SYSTOLIC BLOOD PRESSURE: 110 MMHG

## 2021-09-14 DIAGNOSIS — Z98.890 STATUS POST INCISION AND DRAINAGE: Primary | ICD-10-CM

## 2021-09-14 PROCEDURE — 99024 POSTOP FOLLOW-UP VISIT: CPT | Performed by: NURSE PRACTITIONER

## 2021-09-14 ASSESSMENT — MIFFLIN-ST. JEOR: SCORE: 2208.17

## 2021-09-14 ASSESSMENT — PAIN SCALES - GENERAL: PAINLEVEL: SEVERE PAIN (7)

## 2021-09-14 NOTE — PATIENT INSTRUCTIONS
Thank you for allowing Radha Duarte CNP and our surgical team to participate in your care. Please call our health unit coordinator at [phone] with scheduling questions or the nurse at [phone] with any other questions or concerns.

## 2021-09-14 NOTE — PROGRESS NOTES
"CLINIC NOTE - POST-OP SURGERY  9/14/2021    Patient:Anton Donovan    Procedure: Exam under anesthesia and incision and drainage of perineal abscess    This is a 31 year old male who is 1 weeks s/p Exam under anesthesia and incision and drainage of perineal abscess.  The patient has incisional pain.  He is treating the incision site with paper towel at this time.    Current Medications:  Current Outpatient Medications   Medication Sig Dispense Refill     ARIPiprazole ER (ABILIFY MAINTENA) 300 MG extended release injection Inject 300 mg into the muscle every 28 days       sertraline (ZOLOFT) 50 MG tablet Take 50 mg by mouth daily         Allergies:  Allergies   Allergen Reactions     Penicillins Unknown       PHYSICAL EXAM:   Vital signs: /86   Pulse 117   Temp 98.2  F (36.8  C) (Tympanic)   Resp 16   Ht 1.854 m (6' 1\")   Wt 120.9 kg (266 lb 9.6 oz)   SpO2 98%   BMI 35.17 kg/m     BMI: Body mass index is 35.17 kg/m .   General: Normal, healthy, cooperative, in no acute distress, alert   Lungs: respirations are non-labored   Abdominal: non-distended   Wounds:  Open granular wound without signs/symptoms of infection noted     ASSESSMENT:    31 year old male who is 1 weeks s/p Exam under anesthesia and incision and drainage of perineal abscess .  Doing well.     PLAN:   Patient to treat incisional site with mepilex ag pads changed daily.    Follow-up in 1 week. Sooner with problems/concerns.          "

## 2021-09-14 NOTE — NURSING NOTE
"Chief Complaint   Patient presents with     Follow Up     surgical folloiw up       Initial /86   Pulse 117   Temp 98.2  F (36.8  C) (Tympanic)   Resp 16   Ht 1.854 m (6' 1\")   Wt 120.9 kg (266 lb 9.6 oz)   SpO2 98%   BMI 35.17 kg/m   Estimated body mass index is 35.17 kg/m  as calculated from the following:    Height as of this encounter: 1.854 m (6' 1\").    Weight as of this encounter: 120.9 kg (266 lb 9.6 oz).  Medication Reconciliation: complete  Bhavya Ferrer LPN  "

## 2021-09-21 ENCOUNTER — OFFICE VISIT (OUTPATIENT)
Dept: SURGERY | Facility: OTHER | Age: 31
End: 2021-09-21
Attending: NURSE PRACTITIONER

## 2021-09-21 VITALS
OXYGEN SATURATION: 98 % | SYSTOLIC BLOOD PRESSURE: 106 MMHG | WEIGHT: 269 LBS | HEART RATE: 70 BPM | HEIGHT: 73 IN | DIASTOLIC BLOOD PRESSURE: 70 MMHG | BODY MASS INDEX: 35.65 KG/M2 | TEMPERATURE: 98 F

## 2021-09-21 DIAGNOSIS — Z98.890 STATUS POST INCISION AND DRAINAGE: Primary | ICD-10-CM

## 2021-09-21 PROCEDURE — 99212 OFFICE O/P EST SF 10 MIN: CPT | Performed by: NURSE PRACTITIONER

## 2021-09-21 ASSESSMENT — MIFFLIN-ST. JEOR: SCORE: 2219.06

## 2021-09-21 ASSESSMENT — PAIN SCALES - GENERAL: PAINLEVEL: NO PAIN (0)

## 2021-09-21 NOTE — PROGRESS NOTES
"CLINIC NOTE - POST-OP SURGERY  9/21/2021    Patient:Anton Donovan    Procedure: Exam under anesthesia and incision and drainage of perineal abscess    This is a 31 year old male who is 2 weeks s/p Exam under anesthesia and incision and drainage of perineal abscess.  The patient has incisional pain but this improving.  He is tolerating mepilex ag pads for wound care.    Current Medications:  Current Outpatient Medications   Medication Sig Dispense Refill     ARIPiprazole ER (ABILIFY MAINTENA) 300 MG extended release injection Inject 300 mg into the muscle every 28 days       sertraline (ZOLOFT) 50 MG tablet Take 50 mg by mouth daily         Allergies:  Allergies   Allergen Reactions     Penicillins Unknown       PHYSICAL EXAM:   Vital signs: /70   Pulse 70   Temp 98  F (36.7  C)   Ht 1.854 m (6' 1\")   Wt 122 kg (269 lb)   SpO2 98%   BMI 35.49 kg/m     BMI: Body mass index is 35.49 kg/m .   General: Normal, healthy, cooperative, in no acute distress, alert   Lungs: respirations are non-labored   Abdominal: non-distended   Wounds:  Open granular wound without signs/symptoms of infection noted.  The wound is healing.     ASSESSMENT:    31 year old male who is 2 weeks s/p Exam under anesthesia and incision and drainage of perineal abscess .  Doing well.     PLAN:   Patient to treat continue to treat incisional site with mepilex ag pads changed daily.    Follow-up in 1 week. Sooner with problems/concerns.        "

## 2021-09-21 NOTE — PATIENT INSTRUCTIONS
Thank you for allowing Radha Duarte CNP and our surgical team to participate in your care. Please call our health unit coordinator at [phone] with scheduling questions or the nurse at [phone] with any other questions or concerns.

## 2021-09-21 NOTE — NURSING NOTE
"Chief Complaint   Patient presents with     Surgical Followup     exam under anesthesia and incision and drainage of perianal abscess 9/7/2021       Initial /70   Pulse 70   Temp 98  F (36.7  C)   Ht 1.854 m (6' 1\")   Wt 122 kg (269 lb)   SpO2 98%   BMI 35.49 kg/m   Estimated body mass index is 35.49 kg/m  as calculated from the following:    Height as of this encounter: 1.854 m (6' 1\").    Weight as of this encounter: 122 kg (269 lb).  Medication Reconciliation:     OSWALD PURCELL LPN    "

## 2022-05-31 PROBLEM — F43.10 PTSD (POST-TRAUMATIC STRESS DISORDER): Status: ACTIVE | Noted: 2022-05-31

## 2022-05-31 RX ORDER — IBUPROFEN 400 MG/1
400 TABLET, FILM COATED ORAL EVERY 6 HOURS PRN
COMMUNITY
End: 2022-07-26

## 2022-06-29 ENCOUNTER — HOSPITAL ENCOUNTER (OUTPATIENT)
Facility: HOSPITAL | Age: 32
End: 2022-06-29
Attending: SURGERY | Admitting: SURGERY
Payer: COMMERCIAL

## 2022-06-29 ENCOUNTER — PREP FOR PROCEDURE (OUTPATIENT)
Dept: SURGERY | Facility: OTHER | Age: 32
End: 2022-06-29

## 2022-06-29 ENCOUNTER — OFFICE VISIT (OUTPATIENT)
Dept: SURGERY | Facility: OTHER | Age: 32
End: 2022-06-29
Attending: NURSE PRACTITIONER
Payer: COMMERCIAL

## 2022-06-29 VITALS
WEIGHT: 214 LBS | SYSTOLIC BLOOD PRESSURE: 120 MMHG | OXYGEN SATURATION: 99 % | BODY MASS INDEX: 28.36 KG/M2 | TEMPERATURE: 96.6 F | DIASTOLIC BLOOD PRESSURE: 60 MMHG | HEIGHT: 73 IN | HEART RATE: 74 BPM

## 2022-06-29 DIAGNOSIS — L05.91 PILONIDAL CYST: Primary | ICD-10-CM

## 2022-06-29 PROCEDURE — 99213 OFFICE O/P EST LOW 20 MIN: CPT | Performed by: NURSE PRACTITIONER

## 2022-06-29 ASSESSMENT — PAIN SCALES - GENERAL: PAINLEVEL: SEVERE PAIN (6)

## 2022-06-29 NOTE — PROGRESS NOTES
"CLINIC NOTE - FOLLOW UP  6/29/2022    Patient:Anton Donovan    This is a 31 year old male established patient who I have seen in the past for perianal abscess which was incised and drained in the OR.  The patient continues to have intermittent pain, swelling, and drainage from an area of his right buttock since last seen by myself.  He at present states the area is non-tender and is not draining.     Current Medications:  Current Outpatient Medications   Medication Sig Dispense Refill     ARIPiprazole ER (ABILIFY MAINTENA) 300 MG extended release injection Inject 300 mg into the muscle every 28 days (Patient not taking: Reported on 6/29/2022)       ibuprofen (ADVIL/MOTRIN) 400 MG tablet Take 400 mg by mouth every 6 hours as needed for moderate pain (Patient not taking: Reported on 6/29/2022)       Multiple Vitamins-Minerals (MULTIVITAMIN ADULTS) TABS  (Patient not taking: Reported on 6/29/2022)       sertraline (ZOLOFT) 50 MG tablet Take 50 mg by mouth daily (Patient not taking: Reported on 6/29/2022)         Allergies:  Allergies   Allergen Reactions     Penicillins Unknown     Shellfish-Derived Products        PHYSICAL EXAM:     Vital signs: /60 (BP Location: Left arm, Patient Position: Chair, Cuff Size: Adult Regular)   Pulse 74   Temp (!) 96.6  F (35.9  C) (Tympanic)   Ht 1.854 m (6' 1\")   Wt 97.1 kg (214 lb)   SpO2 99%   BMI 28.23 kg/m     BMI: Body mass index is 28.23 kg/m .   General: Normal, healthy, cooperative, in no acute distress, alert   Skin: raised area of skin with a pit noted to the upper right buttock.  There is no swelling or drainage noted from the area when seen today.    Lungs: respirations are non-labored   Abdominal: non-distended   Extremities: No cyanosis, clubbing or edema noted bilaterally in Upper and Lower Extremities   Neurological: without deficit    ASSESSMENT:  31 year old male here for possible pilonidal cyst    PLAN:   Will schedule the patient for a rectal examine " under anesthesia and possible pilonidal cystectomy in the OR.  The risks and benefits of these procedures were discussed with the patient and informed consent was received. Patient will need pre-operative clearance for this procedure.     FOLLOW UP: sooner than scheduled in the OR with problems/concerns.

## 2022-06-29 NOTE — NURSING NOTE
"Chief Complaint   Patient presents with     Consult     Pilonidal cyst without abscess       Initial /60 (BP Location: Left arm, Patient Position: Chair, Cuff Size: Adult Regular)   Pulse 74   Temp (!) 96.6  F (35.9  C) (Tympanic)   Ht 1.854 m (6' 1\")   Wt 97.1 kg (214 lb)   SpO2 99%   BMI 28.23 kg/m   Estimated body mass index is 28.23 kg/m  as calculated from the following:    Height as of this encounter: 1.854 m (6' 1\").    Weight as of this encounter: 97.1 kg (214 lb).  Medication Reconciliation: complete  MERRILL GEIGER LPN    "

## 2022-06-29 NOTE — PATIENT INSTRUCTIONS
Thank you for allowing our surgical team to participate in your care. Please review the instructions below.   If you have questions, you may contact us at the any of the following numbers:     [facility] Health Unit Coordinator: [phone]  Clinic Surgery Nurse (Kristine): [phone]  Hospital Surgery Education Nurse: [phone]    You are scheduled for: Exam Under Anesthesia with possible Pilonidal Cystectomy with Dr. Cruz on 8/1/22.  Admit Time: Hospital Surgery will call you the day before your procedure by 5pm with your arrival time.   If your surgery is on Monday, expect a call on Friday.   If you are not contacted before 5pm, please call admitting at [phone].   After hours or on weekends, please call [phone] to postpone.   Call the clinic surgery nurse if you become ill within 2 weeks of your procedure to reschedule.   This includes fever, chills, sore throat, cough, chest congestion, runny nose, or any other symptom of any other illness.     Preop appointment needed within the 30 days prior to your procedure.     COVID-19 test is needed prior to procedure. Please see handout for additional information.      Please call the Hospital Surgery Education Nurse at [phone] 1 week prior to your procedure and have a medication list ready.   Do not take Aspirin or other NSAIDS (Ibuprofen, Motrin, Aleve, Celebrex, Naproxen, etc), vitamins/supplements 7 days before your surgery unless you have been otherwise directed.   If you are prescribed a daily 81mg Aspirin, you may continue this.   If you are prescribed blood thinners or insulin, please contact your primary care provider for instructions.    Shower the night before and the morning of your procedure with dial soap.  On The Night Before Your Surgery:  1.  Purchase Dial soap. Remove your jewelry and leave off until after surgery. Wash your hair and body with your regular soap/shampoo. Use a freshly washed washcloth. Include cleaning inside your belly button.  Rinse off soap/shampoo.  2. Wash your body from neck to feet using Dial soap in an amount large enough to generously cover your body with your bare hands. Leave the soap on your body for one minute and rinse.   3. Repeat step 2.  4. Rinse off all Dial soap and dry with a freshly washed towel. Do not use lotions or hair products.  5. Sleep in freshly washed pajamas and freshly washed bedding.  On The Morning of Your Surgery:  1.Wash your hair and body with your regular soap/shampoo. Use another freshly washed washcloth. Rinse off.   2. Wash your body from neck to feet using Dial soap in an amount large enough to generously cover your body with your bare hands. Leave the soap on your body for one minute and rinse.   3. Repeat step 2.  4. Rinse off all the Dial soap and dry with another freshly washed towel. Do not use lotions or hair products.  5. Wear freshly washed clothing to the hospital.    Do not have anything to eat or drink after midnight the night before your surgery (or 8 hours prior to surgery), except clear liquids (water, clear juice, clear broth, plain coffee or tea without cream or milk) up until 2 hours prior to arrival time, and then nothing by mouth.   Do not eat, drink, chew gum, suck on hard candy, or smoke after 2 hours prior to arrival. You can brush your teeth.   If you are directed to take any medications, take them with a tiny sip of water.   If you use an inhaler, bring it with you.  Arrive in clean, comfortable clothing.   Do not wear any jewelry, make-up, nail polish, lotions, hair products, or perfumes.   [place] in hospital admitting through the [facility].  A responsible adult must be available to drive you home and stay with you for 24 hours at home. If you need to take a taxi or the bus, you must have another responsible adult to ride with you. Your procedure will be cancelled if you do not have a responsible adult .         SURGERY HANDBOOK            Your surgery is  scheduled:    Date: 8/1/22  ________________________________    Time: Hospital surgery will call with your arrival time 1 day prior to procedure.  ________________________________    Surgeon's Name: Dr. Cruz  _______________________        Pre-Op Physical Fax Numbers:          Pre-Admissions  [phone]        Your surgery is located at:  [address] [address]         Before Your Surgery  For Patients and Visitors at [facility]    Welcome  You have been scheduled for surgery and we would like to give you some information that will assist in helping get the best possible outcome.    As you get ready for surgery, you may have a lot of questions.  This brochure will help you know what to expect before and after surgery.  You and your family are the most important members of your health care team.  You will need to take an active role in your care.    Be sure to ask questions and learn all that you can about your surgery.  If you have any safety concerns, we urge you to tell a nurse as soon as possible.   This brochure is for information only.  It does not replace the advice of your doctor.  Always follow your doctor's advice.    If you or your  are deaf or hard of hearing, or prefer a language other than English, please let us know.  We have many free services, including interpreters and other aids to help you communicate. You may ask for help  through any member of your care team or by calling Language Services at [phone], option 2.    Before Surgery:   If for any reason you decide not to have the surgery, please contact our office.  We can easily cancel or reschedule the procedure. Please call your surgeon's office, after hours call [phone]    Any pain related to the surgery that occurs before the surgery needs to be reported and managed by your primary care or referring doctor.    Please keep in mind that the time of surgery is subject to  change.  Make sure you have nothing to eat or drink after midnight.  If your surgery is later in the afternoon, this recommendation might change, but not until the day before surgery after the actual time of the surgery has been established.      GETTING READY FOR SURGERY  Always follow your surgeon's instructions.  If you don't, your surgery could be canceled.  Please use the following checklist.    Within 30 Days of Surgery:   Have a pre-surgery physical exam with your family doctor or partner.  If you use a [facility], all of your information from the pre-op physical will be in the Epic computer system.  Ask the doctor to send all of your results to the hospital before the surgery.  The doctor also may ask you to bring the results with you on the day of surgery or you can fax them to [phone].    Tell the doctor if:  You are allergic to latex or rubber  (Latex and rubber gloves are often used in medical care).  You are taking any medicines (including aspirin), vitamins (Vitamin E, Fish Oil, Omegas) or herbal products.  You will need to stop taking some medicines before surgery.  You have any medical problems (allergies, diabetes or heart disease, for example).  You have a pacemaker or an AICD (automatic implanted cardiac defibrillator).  If you do, please bring the ID card with you on the day of surgery.  You are a smoker.  People who smoke have a higher risk of infection after surgery.  Ask your doctor how you can quit smoking.    Within 7 days of Surgery:  Prior to your surgical procedure, a nurse will be contacting you to obtain a health history. A nurse will call you within a few days of surgery to go over these and other instructions.  If you do not hear from them, please call them at [phone].      Call your insurance company.  Ask if you need pre-approval for your surgery.  If you do not have insurance, please let us know.    Arrange for someone to drive you home after surgery.  If you  will have same-day surgery, you may not drive or take public transportation home by yourself.  Arrange for someone to stay with you for 24 hours after you go home.  This person must be a responsible adult (ie- Family member or friend).    The Day Before Surgery:   Call your surgeon if there are any changes in your health.  This includes signs of a cold or flu (such as a sore throat, runny nose, cough, rash or fever).  Do not smoke, drink alcohol or take over-the-counter medicine (unless your surgeon tells you to) for 24 hours before and after surgery.  If you take prescribed drugs:  You may need to stop them until after the surgery.  Follow your doctor's orders.  You may resume Aspirin and/or blood thinners after your surgery as directed by your physician/surgeon.  NO FOOD OR DRINK AFTER MIDNIGHT.  Follow your surgeon's orders for eating and drinking.  You need to have an empty stomach before surgery.  This will make the surgery as safe as possible.  If you don't follow your doctor's orders, your surgery could be changed to another date.    The Day of Surgery:  Take a shower or bath the night before and the morning of surgery.  Use antiseptic soap or the soap your surgeon gave you.  Gently clean the skin.  Do not shave or scrub your surgery site.  Please remove deodorant, cologne, scented lotion, makeup, nail polish and jewelry (including rings and body piercings).  If you wear artificial nails, please remove at least one nail before coming to the hospital.  Wear clean, loose clothing to the hospital.  Bring these items to the hospital:  Your insurance card.  A list of all the medicines you take.  Include vitamins, minerals, herbs and over-the-counter drugs.  Note any drug allergies.  A copy of your advance health care directive, if you have one.  This tells us what treatment you would want -- and who would make health care decisions -- if you could no longer speak for yourself.  You may request this form in advance  or download it from [email or web address].  A case for any glasses, contact lenses, hearing aids or dentures.  Your inhaler or CPAP machine, if you use these at home.  Leave extra cash, jewelry and other valuables at home.      When You Arrive:  When you get to the hospital, you will:  Check in.  If you are under age 18, you must be with a parent or legal guardian.  Electronically sign consent forms, if you haven't already.  These electronic forms state that you know the risks and benefits of surgery.  When you sign the forms, you give us permission to do the surgery.  Do not sign them unless you understand what will happen during and after your surgery.  If you have any questions about your surgery, ask to speak with your doctor before you sign the forms.  If you don't understand the answers, ask again.  Receive a copy of the Patients Bill of Rights.  If you do not receive a copy, please ask for one.  Change into hospital clothes.  Your belongings will be placed in a bag.  We will return them to you after surgery.  Meet with the anesthesia provider.  He or she will tell you what kind of anesthesia (medicine) will be used to keep you comfortable during surgery.  Remember: It's okay to remind doctors and nurses to wash their hands before touching you.   In most cases, your surgeon will use a marker to write his or her initials on the surgery site.  This ensures that the exact site is operated on.  For safety reasons, we will ask you the same questions many times.  For example, we may ask your name and birth date over and over again.  Friends and family can stay with you until it's time for surgery.  While you're in surgery, they will be in the waiting area.  Please note that cell phones are not allowed in some patient care areas.  If you have questions about what will happen in the operating room, talk to your care team.    After Surgery:  We will move you to a recovery room where we will watch you closely.  If  "you have any pain or discomfort, tell your nurse.  He or she will try to make you comfortable.    If you are staying overnight we will move you to your hospital room after you are awake.  If you are going home we will move you to another room.  Friends and family may be able to join you.  The length of time you spend in recovery depends on the type of medicine you received, your medical condition, and the type of surgery you had.  Dealing with pain:  A nurse will check your comfort level often during your stay.  He or she will work with you to manage your pain.  Remember:  All pain is real.  There are many ways to control pain.  We can help you decide what works best for you.  Ask for pain medicine when you need it.  Don't try to \"tough it out\" -- this can make you feel worse.  Always take your medicine as ordered.  Medicine doesn't work the same for everyone.  If your medicine isn't working tell your nurse.  There may be other medicines or treatments we can try.    Going Home:  We will let you know when you're ready to leave the hospital.  Before you leave, we will tell you how to care for yourself at home and prevent infections.  If you do not understand something, please say so.  We will answer any questions you have.  We will then help you get ready to leave.  You must have an adult with you for the first 24 hours after you leave the hospital. Take it easy when you get home.  You will need some time to recover -- you may be more tired than you realize at first.  Rest and relax for at least the first 24 hours at home.  You'll feel better and heal faster if you take good care of yourself.                      Pre-Operative Surgery Scrub    Purpose:   The skin harbors a large variety of bacteria, both infectious and noninfectious.  Showering with an antiseptic soap prior to an invasive procedure will decrease the number of transient and resident (good and bad) bacteria that is normally found on the " skin.    Procedure:    Shower with 1 bottle of antiseptic soap (enclosed) the evening before and 1 bottle the morning of surgery (bathing the day of the procedure is most important).     Apply the soap at full strength (use the entire bottle).  Gently clean the skin, rinse, and dry with a clean towel that is freshly laundered (out of the dryer) and then put on clean clothing that is freshly laundered.      We have given you information regarding pre-op showering.  We recommend that patients wash with an antiseptic soap prior to surgery.  This cleanser will be given to you at the clinic or mailed to your home.  It is advised that you liberally wash the specific area surgery area the night before, and again in the morning before the surgery.  Do not apply lotion afterward.  We would like to keep the skin as clean as possible.    Thank you for following these important instructions.          After Surgery:  When you are discharged from the recovery room, the nurses will review instructions with you and your caregiver.    Please wash your hands every time you touch the wound or change bandages or dressings.    Do not submerge the wound in water.  You may not use a bathtub or hot tub until the wound is closed.  The wait time frame is generally 2-3 weeks but any open area can be a source of incoming bacteria, so it is better to be on the safe side and avoid the tub until your wound is fully healed.    You may take a shower 24 hours after surgery.  Double check with your surgeon if it is ok for water to run over a wound, whether it has been sutured, stapled, glued or is open.  You may gently wash the wound using the antiseptic soap provided for your pre-surgery showering (do not use a washcloth).  Any mild soap will work as well.    Many surgical wounds will have small white strips of tape on them called Steri Strips.  Do not remove these.  The edges will curl and fall off within 7-10 days with normal showering.    If you  are going home with sutures (stitches) or staples, you must return to the clinic to have them taken out, usually within 1-2 weeks.    Signs and symptoms of infection include:  Fever, temperature over 101.5 ' F  Redness  Swelling  Increasing pain  Green or yellow drainage which may or may not have a foul odor.    Symptoms of infection need to be reported to your surgery office. Please call your Surgeon.

## 2022-07-21 ENCOUNTER — ANESTHESIA EVENT (OUTPATIENT)
Dept: SURGERY | Facility: HOSPITAL | Age: 32
End: 2022-07-21
Payer: COMMERCIAL

## 2022-07-21 RX ORDER — OXYCODONE HYDROCHLORIDE 5 MG/1
5 TABLET ORAL EVERY 4 HOURS PRN
Status: CANCELLED | OUTPATIENT
Start: 2022-07-21

## 2022-07-21 RX ORDER — HYDRALAZINE HYDROCHLORIDE 20 MG/ML
2.5-5 INJECTION INTRAMUSCULAR; INTRAVENOUS EVERY 10 MIN PRN
Status: CANCELLED | OUTPATIENT
Start: 2022-07-21

## 2022-07-21 RX ORDER — FENTANYL CITRATE 50 UG/ML
50 INJECTION, SOLUTION INTRAMUSCULAR; INTRAVENOUS EVERY 5 MIN PRN
Status: CANCELLED | OUTPATIENT
Start: 2022-07-21

## 2022-07-21 RX ORDER — METOPROLOL TARTRATE 1 MG/ML
1-2 INJECTION, SOLUTION INTRAVENOUS EVERY 5 MIN PRN
Status: CANCELLED | OUTPATIENT
Start: 2022-07-21

## 2022-07-21 RX ORDER — LIDOCAINE 40 MG/G
CREAM TOPICAL
Status: CANCELLED | OUTPATIENT
Start: 2022-07-21

## 2022-07-21 RX ORDER — FENTANYL CITRATE 50 UG/ML
50 INJECTION, SOLUTION INTRAMUSCULAR; INTRAVENOUS
Status: CANCELLED | OUTPATIENT
Start: 2022-07-21

## 2022-07-21 RX ORDER — ONDANSETRON 4 MG/1
4 TABLET, ORALLY DISINTEGRATING ORAL EVERY 30 MIN PRN
Status: CANCELLED | OUTPATIENT
Start: 2022-07-21

## 2022-07-21 RX ORDER — SODIUM CHLORIDE, SODIUM LACTATE, POTASSIUM CHLORIDE, CALCIUM CHLORIDE 600; 310; 30; 20 MG/100ML; MG/100ML; MG/100ML; MG/100ML
INJECTION, SOLUTION INTRAVENOUS CONTINUOUS
Status: CANCELLED | OUTPATIENT
Start: 2022-07-21

## 2022-07-21 RX ORDER — MEPERIDINE HYDROCHLORIDE 25 MG/ML
12.5 INJECTION INTRAMUSCULAR; INTRAVENOUS; SUBCUTANEOUS
Status: CANCELLED | OUTPATIENT
Start: 2022-07-21

## 2022-07-21 RX ORDER — ONDANSETRON 2 MG/ML
4 INJECTION INTRAMUSCULAR; INTRAVENOUS EVERY 30 MIN PRN
Status: CANCELLED | OUTPATIENT
Start: 2022-07-21

## 2022-07-21 RX ORDER — HYDROMORPHONE HYDROCHLORIDE 1 MG/ML
0.2 INJECTION, SOLUTION INTRAMUSCULAR; INTRAVENOUS; SUBCUTANEOUS EVERY 5 MIN PRN
Status: CANCELLED | OUTPATIENT
Start: 2022-07-21

## 2022-07-21 ASSESSMENT — LIFESTYLE VARIABLES: TOBACCO_USE: 1

## 2022-07-21 NOTE — ANESTHESIA PREPROCEDURE EVALUATION
Anesthesia Pre-Procedure Evaluation    Patient: Anton Donovan   MRN: [id number] : 1990        Procedure : Procedure(s):  EXAM UNDER ANESTHESIA, RECTUM  Possible excision pilonidal cyst          Past Medical History:   Diagnosis Date     Alcohol dependence (H)      Bipolar I disorder, most recent episode (or current) manic (H)      Chronic low back pain      Hereditary and idiopathic peripheral neuropathy      PTSD (post-traumatic stress disorder)      Sleep apnea      Tobacco use       Past Surgical History:   Procedure Laterality Date     EXAM UNDER ANESTHESIA RECTUM N/A 2021    Procedure: EXAM UNDER ANESTHESIA, I&D of Abssess;  Surgeon: Kale Cruz MD;  Location: [facility] OR      Allergies   Allergen Reactions     Penicillins Unknown     Shellfish-Derived Products       Social History     Tobacco Use     Smoking status: Current Every Day Smoker     Packs/day: 1.00     Types: Cigarettes     Start date: 2018     Smokeless tobacco: Former User     Quit date: 2018     Tobacco comment: declines quitplan referral 2021   Substance Use Topics     Alcohol use: Not on file      Wt Readings from Last 1 Encounters:   22 97.1 kg (214 lb)        Anesthesia Evaluation            ROS/MED HX  ENT/Pulmonary:     (+) sleep apnea, uses CPAP, tobacco use, Current use, 1 packs/day,     Neurologic: Comment: Hereditary idiopathic peripheral neuropathy      Cardiovascular:       METS/Exercise Tolerance:     Hematologic:       Musculoskeletal: Comment: Chronic low back pain      GI/Hepatic: Comment: Anal fistula        Renal/Genitourinary:       Endo:       Psychiatric/Substance Use:     (+) psychiatric history bipolar, other (comment) and depression (PTSD) alcohol abuse (alcohol dependence)     Infectious Disease:       Malignancy:       Other:      (+) , H/O Chronic Pain,           OUTSIDE LABS:  CBC: No results found for: WBC, HGB, HCT, PLT  BMP: No results found for: NA, POTASSIUM, CHLORIDE,  CO2, BUN, CR, GLC  COAGS: No results found for: PTT, INR, FIBR  POC: No results found for: BGM, HCG, HCGS  HEPATIC: No results found for: ALBUMIN, PROTTOTAL, ALT, AST, GGT, ALKPHOS, BILITOTAL, BILIDIRECT, OSIRIS  OTHER: No results found for: PH, LACT, A1C, ALMA, PHOS, MAG, LIPASE, AMYLASE, TSH, T4, T3, CRP, SED    Anesthesia Plan    ASA Status:  3      Anesthesia Type: MAC.     - Reason for MAC: straight local not clinically adequate              Consents            Postoperative Care            Comments:    Other Comments: H&P 7/26 [place]    Case listed MAC with local, Use saddle block?            ROSANNA ALMEIDA CRNA

## 2022-07-26 ENCOUNTER — OFFICE VISIT (OUTPATIENT)
Dept: FAMILY MEDICINE | Facility: OTHER | Age: 32
End: 2022-07-26
Attending: NURSE PRACTITIONER
Payer: COMMERCIAL

## 2022-07-26 ENCOUNTER — LAB (OUTPATIENT)
Dept: LAB | Facility: OTHER | Age: 32
End: 2022-07-26
Attending: NURSE PRACTITIONER
Payer: COMMERCIAL

## 2022-07-26 VITALS
TEMPERATURE: 98.1 F | WEIGHT: 216 LBS | BODY MASS INDEX: 28.63 KG/M2 | DIASTOLIC BLOOD PRESSURE: 64 MMHG | HEART RATE: 81 BPM | SYSTOLIC BLOOD PRESSURE: 120 MMHG | HEIGHT: 73 IN | OXYGEN SATURATION: 97 %

## 2022-07-26 DIAGNOSIS — S30.860A TICK BITE OF BACK, INITIAL ENCOUNTER: ICD-10-CM
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
## 2022-07-26 DIAGNOSIS — F10.21 ALCOHOL DEPENDENCE IN REMISSION (H): ICD-10-CM

## 2022-07-26 DIAGNOSIS — Z01.818 PREOP GENERAL PHYSICAL EXAM: ICD-10-CM

## 2022-07-26 DIAGNOSIS — Z01.818 PREOP GENERAL PHYSICAL EXAM: Primary | ICD-10-CM

## 2022-07-26 DIAGNOSIS — L05.91 PILONIDAL CYST WITHOUT ABSCESS: ICD-10-CM

## 2022-07-26 LAB
ANION GAP SERPL CALCULATED.3IONS-SCNC: 4 MMOL/L (ref 3–14)
BASOPHILS # BLD AUTO: 0.1 10E3/UL (ref 0–0.2)
BASOPHILS NFR BLD AUTO: 1 %
BUN SERPL-MCNC: 9 MG/DL (ref 7–30)
CALCIUM SERPL-MCNC: 9.2 MG/DL (ref 8.5–10.1)
CHLORIDE BLD-SCNC: 106 MMOL/L (ref 94–109)
CO2 SERPL-SCNC: 28 MMOL/L (ref 20–32)
CREAT SERPL-MCNC: 0.85 MG/DL (ref 0.66–1.25)
EOSINOPHIL # BLD AUTO: 0.4 10E3/UL (ref 0–0.7)
EOSINOPHIL NFR BLD AUTO: 5 %
ERYTHROCYTE [DISTWIDTH] IN BLOOD BY AUTOMATED COUNT: 12.6 % (ref 10–15)
GFR SERPL CREATININE-BSD FRML MDRD: >90 ML/MIN/1.73M2
GLUCOSE BLD-MCNC: 102 MG/DL (ref 70–99)
HCT VFR BLD AUTO: 49.3 % (ref 40–53)
HGB BLD-MCNC: 17.3 G/DL (ref 13.3–17.7)
IMM GRANULOCYTES # BLD: 0 10E3/UL
IMM GRANULOCYTES NFR BLD: 0 %
LYMPHOCYTES # BLD AUTO: 2.5 10E3/UL (ref 0.8–5.3)
LYMPHOCYTES NFR BLD AUTO: 31 %
MCH RBC QN AUTO: 33.5 PG (ref 26.5–33)
MCHC RBC AUTO-ENTMCNC: 35.1 G/DL (ref 31.5–36.5)
MCV RBC AUTO: 96 FL (ref 78–100)
MONOCYTES # BLD AUTO: 0.5 10E3/UL (ref 0–1.3)
MONOCYTES NFR BLD AUTO: 6 %
NEUTROPHILS # BLD AUTO: 4.6 10E3/UL (ref 1.6–8.3)
NEUTROPHILS NFR BLD AUTO: 57 %
NRBC # BLD AUTO: 0 10E3/UL
NRBC BLD AUTO-RTO: 0 /100
PLATELET # BLD AUTO: 255 10E3/UL (ref 150–450)
POTASSIUM BLD-SCNC: 3.7 MMOL/L (ref 3.4–5.3)
RBC # BLD AUTO: 5.16 10E6/UL (ref 4.4–5.9)
SODIUM SERPL-SCNC: 138 MMOL/L (ref 133–144)
WBC # BLD AUTO: 8.2 10E3/UL (ref 4–11)

## 2022-07-26 PROCEDURE — 80048 BASIC METABOLIC PNL TOTAL CA: CPT

## 2022-07-26 PROCEDURE — 85025 COMPLETE CBC W/AUTO DIFF WBC: CPT

## 2022-07-26 PROCEDURE — 99214 OFFICE O/P EST MOD 30 MIN: CPT | Performed by: NURSE PRACTITIONER

## 2022-07-26 PROCEDURE — 36415 COLL VENOUS BLD VENIPUNCTURE: CPT

## 2022-07-26 RX ORDER — MUPIROCIN 20 MG/G
OINTMENT TOPICAL 3 TIMES DAILY
Qty: 30 G | Refills: 0 | Status: ON HOLD | OUTPATIENT
Start: 2022-07-26 | End: 2022-08-08

## 2022-07-26 ASSESSMENT — ANXIETY QUESTIONNAIRES
GAD7 TOTAL SCORE: 0
6. BECOMING EASILY ANNOYED OR IRRITABLE: NOT AT ALL
5. BEING SO RESTLESS THAT IT IS HARD TO SIT STILL: NOT AT ALL
4. TROUBLE RELAXING: NOT AT ALL
2. NOT BEING ABLE TO STOP OR CONTROL WORRYING: NOT AT ALL
3. WORRYING TOO MUCH ABOUT DIFFERENT THINGS: NOT AT ALL
GAD7 TOTAL SCORE: 0
1. FEELING NERVOUS, ANXIOUS, OR ON EDGE: NOT AT ALL
7. FEELING AFRAID AS IF SOMETHING AWFUL MIGHT HAPPEN: NOT AT ALL

## 2022-07-26 ASSESSMENT — PAIN SCALES - GENERAL: PAINLEVEL: NO PAIN (0)

## 2022-07-26 ASSESSMENT — PATIENT HEALTH QUESTIONNAIRE - PHQ9: SUM OF ALL RESPONSES TO PHQ QUESTIONS 1-9: 0

## 2022-07-26 NOTE — PROGRESS NOTES
[facility] [place]  [address]  [place] [address]  Phone: [phone]  Primary Provider: No Ref-Primary, Physician  Pre-op Performing Provider: VIDA LAIRD      PREOPERATIVE EVALUATION:  Today's date: 7/26/2022    Anton Donovan is a 31 year old male who presents for a preoperative evaluation.    Surgical Information:  Surgery/Procedure: Pilonidal cyst without abscess removal  [place] [facility] [place]: Dr. Cruz  Surgery Date: 8/1/22  Time of Surgery: tbd  Where patient plans to recover: At home with family  Fax number for surgical facility: Note does not need to be faxed, will be available electronically in Epic.    Type of Anesthesia Anticipated: to be determined    Assessment & Plan     The proposed surgical procedure is considered INTERMEDIATE risk.    Preop general physical exam  Pilonidal cyst without abscess  Cleared for surgery. CBC and BMP unremarkable. See below, he does have a tick bite mid upper back. Does not appear infected. Ok to proceed with surgery unless he develops new symptoms. Will then return right away.     Alcohol dependence in remission (H)  No longer doing heavy drinking. Drinks about 1-2 times per week and typically has 2 beers.     Tick bite of back, initial encounter  Patient with wood tick bite mid upper back. No signs of infection noted, but looks irritated. Will have him apply Bactroban BID. I do not feel he needs oral antibiotics. Patient will monitor and return if area looks worse. He was otherwise cleared for surgery.     - mupirocin (BACTROBAN) 2 % external ointment; Apply topically 3 times daily    Possible Sleep Apnea: Will bring CPAP with to surgery.     Risks and Recommendations:  The patient has the following additional risks and recommendations for perioperative complications:  Pulmonary:    - Incentive spirometry post-op   - Active nicotine user, advised smoking cessation    Medication Instructions:  Patient is on no chronic  medications    RECOMMENDATION:  APPROVAL GIVEN to proceed with proposed procedure, without further diagnostic evaluation.    Anton Donovan with a functional capacity of greater than four MET's. There are no obvious contraindications to proceeding with surgery at this time. Recommend that the surgeon review risks and benefits of the procedure prior to proceeding.     Was recommended to avoid eating and drinking anything 12 hours prior to surgery unless his surgeon tells him otherwise.   [id number]}    Subjective     HPI related to upcoming procedure: Patient has had a pilonidal cyst for one year. Often drains fluid. Dr. Cruz plans to remove.       Preop Questions 7/26/2022   1. Have you ever had a heart attack or stroke? No   2. Have you ever had surgery on your heart or blood vessels, such as a stent placement, a coronary artery bypass, or surgery on an artery in your head, neck, heart, or legs? No   3. Do you have chest pain with activity? No   4. Do you have a history of  heart failure? No   5. Do you currently have a cold, bronchitis or symptoms of other infection? No   6. Do you have a cough, shortness of breath, or wheezing? No   7. Do you or anyone in your family have previous history of blood clots? No   8. Do you or does anyone in your family have a serious bleeding problem such as prolonged bleeding following surgeries or cuts? No   9. Have you ever had problems with anemia or been told to take iron pills? No   10. Have you had any abnormal blood loss such as black, tarry or bloody stools? No   11. Have you ever had a blood transfusion? No   12. Are you willing to have a blood transfusion if it is medically needed before, during, or after your surgery? Yes   13. Have you or any of your relatives ever had problems with anesthesia? No   14. Do you have sleep apnea, excessive snoring or daytime drowsiness? YES - Has CPAP, will bring morning of surgery   14a. Do you have a CPAP machine? Yes   15. Do you have any  artifical heart valves or other implanted medical devices like a pacemaker, defibrillator, or continuous glucose monitor? No   16. Do you have artificial joints? No   17. Are you allergic to latex? No     Health Care Directive:  Patient does not have a Health Care Directive or Living Will: Discussed advance care planning with patient; however, patient declined at this time.    Preoperative Review of :   reviewed - no record of controlled substances prescribed.      Status of Chronic Conditions:  DEPRESSION/PTSD - Patient has a long history of Depression of moderate severity requiring medication for control with recent symptoms being stable. Current symptoms of depression include none. Does counseling, but is currently not taking anything for his mental health.     Mets greater than 4, able to walk a block without stopping.     History of heavy alcohol use, now drinks 1-2 times per week and typically has 2 beers.     He does smoke, currently smoking over 1 ppd.     Plans to complete Covid test prior to surgery.     He did have a wood tick bite 9 days ago, [facility] Was told to monitor. No fevers. No pain over the area.     Review of Systems  CONSTITUTIONAL: NEGATIVE for fever, chills, change in weight  INTEGUMENTARY/SKIN: NEGATIVE for worrisome rashes, moles or lesions  EYES: NEGATIVE for vision changes or irritation  ENT/MOUTH: NEGATIVE for ear, mouth and throat problems  RESP: NEGATIVE for significant cough or SOB  CV: NEGATIVE for chest pain, palpitations or peripheral edema  GI: NEGATIVE for nausea, abdominal pain, heartburn, or change in bowel habits  : NEGATIVE for frequency, dysuria, or hematuria  MUSCULOSKELETAL: NEGATIVE for significant arthralgias or myalgia  NEURO: NEGATIVE for weakness, dizziness or paresthesias  ENDOCRINE: NEGATIVE for temperature intolerance, skin/hair changes  HEME: NEGATIVE for bleeding problems  PSYCHIATRIC: NEGATIVE for changes in mood or  "affect    Patient Active Problem List    Diagnosis Date Noted     Alcohol dependence in remission (H) 07/26/2022     Priority: Medium     PTSD (post-traumatic stress disorder) 05/31/2022     Priority: Medium     Anal fistula 08/24/2021     Priority: Medium     Added automatically from request for surgery [id number]        Past Medical History:   Diagnosis Date     Alcohol dependence (H)      Bipolar I disorder, most recent episode (or current) manic (H)      Chronic low back pain      Hereditary and idiopathic peripheral neuropathy      PTSD (post-traumatic stress disorder)      Sleep apnea      Tobacco use      Past Surgical History:   Procedure Laterality Date     EXAM UNDER ANESTHESIA RECTUM N/A 9/7/2021    Procedure: EXAM UNDER ANESTHESIA, I&D of Abssess;  Surgeon: Kale Cruz MD;  Location: [facility] OR     Current Outpatient Medications   Medication Sig Dispense Refill     mupirocin (BACTROBAN) 2 % external ointment Apply topically 3 times daily 30 g 0     Multiple Vitamins-Minerals (MULTIVITAMIN ADULTS) TABS  (Patient not taking: No sig reported)         Allergies   Allergen Reactions     Penicillins Unknown     Shellfish-Derived Products         Social History     Tobacco Use     Smoking status: Current Every Day Smoker     Packs/day: 1.00     Types: Cigarettes     Start date: 9/20/2018     Smokeless tobacco: Former User     Quit date: 9/20/2018     Tobacco comment: declines quitplan referral 9/21/2021   Substance Use Topics     Alcohol use: Not on file     Family History   Problem Relation Age of Onset     Bladder Cancer Mother      Throat cancer Father      History   Drug Use Not on file         Objective     /64 (BP Location: Right arm, Patient Position: Sitting, Cuff Size: Adult Regular)   Pulse 81   Temp 98.1  F (36.7  C) (Tympanic)   Ht 1.854 m (6' 1\")   Wt 98 kg (216 lb)   SpO2 97%   BMI 28.50 kg/m      Physical Exam    GENERAL APPEARANCE: healthy, alert and no distress     EYES: EOMI, " PERRL     HENT: ear canals and TM's normal and nose and mouth without ulcers or lesions     NECK: no adenopathy, no asymmetry, masses, or scars and thyroid normal to palpation     RESP: lungs clear to auscultation - no rales, rhonchi or wheezes     CV: regular rates and rhythm, normal S1 S2, no S3 or S4 and no murmur, click or rub     ABDOMEN:  soft, nontender, no HSM or masses and bowel sounds normal     MS: extremities normal- no gross deformities noted, no evidence of inflammation in joints, FROM in all extremities.     SKIN: patient with tick bite mid upper back, slight surrounding erythema with central puncture-approx size of nickel, no erythema migrans rash     NEURO: Normal strength and tone, sensory exam grossly normal, mentation intact and speech normal     PSYCH: mentation appears normal. and affect normal/bright     LYMPHATICS: No cervical adenopathy    No results for input(s): HGB, PLT, INR, NA, POTASSIUM, CR, A1C in the last 35298 hours.     Diagnostics:  Recent Results (from the past 24 hour(s))   Basic metabolic panel    Collection Time: 07/26/22  2:53 PM   Result Value Ref Range    Sodium 138 133 - 144 mmol/L    Potassium 3.7 3.4 - 5.3 mmol/L    Chloride 106 94 - 109 mmol/L    Carbon Dioxide (CO2) 28 20 - 32 mmol/L    Anion Gap 4 3 - 14 mmol/L    Urea Nitrogen 9 7 - 30 mg/dL    Creatinine 0.85 0.66 - 1.25 mg/dL    Calcium 9.2 8.5 - 10.1 mg/dL    Glucose 102 (H) 70 - 99 mg/dL    GFR Estimate >90 >60 mL/min/1.73m2   CBC with platelets and differential    Collection Time: 07/26/22  2:53 PM   Result Value Ref Range    WBC Count 8.2 4.0 - 11.0 10e3/uL    RBC Count 5.16 4.40 - 5.90 10e6/uL    Hemoglobin 17.3 13.3 - 17.7 g/dL    Hematocrit 49.3 40.0 - 53.0 %    MCV 96 78 - 100 fL    MCH 33.5 (H) 26.5 - 33.0 pg    MCHC 35.1 31.5 - 36.5 g/dL    RDW 12.6 10.0 - 15.0 %    Platelet Count 255 150 - 450 10e3/uL    % Neutrophils 57 %    % Lymphocytes 31 %    % Monocytes 6 %    % Eosinophils 5 %    % Basophils 1 %     % Immature Granulocytes 0 %    NRBCs per 100 WBC 0 <1 /100    Absolute Neutrophils 4.6 1.6 - 8.3 10e3/uL    Absolute Lymphocytes 2.5 0.8 - 5.3 10e3/uL    Absolute Monocytes 0.5 0.0 - 1.3 10e3/uL    Absolute Eosinophils 0.4 0.0 - 0.7 10e3/uL    Absolute Basophils 0.1 0.0 - 0.2 10e3/uL    Absolute Immature Granulocytes 0.0 <=0.4 10e3/uL    Absolute NRBCs 0.0 10e3/uL      Was not fasting.     No EKG required, no history of coronary heart disease, significant arrhythmia, peripheral arterial disease or other structural heart disease.    Revised Cardiac Risk Index (RCRI):  The patient has the following serious cardiovascular risks for perioperative complications:   - No serious cardiac risks = 0 points     RCRI Interpretation: 0 points: Class I (very low risk - 0.4% complication rate)           Signed Electronically by: Smitha Em NP  Copy of this evaluation report is provided to requesting physician.

## 2022-07-26 NOTE — PATIENT INSTRUCTIONS
Avoid smoking the morning of surgery.   Preparing for Your Surgery  Getting started  A nurse will call you to review your health history and instructions. They will give you an arrival time based on your scheduled surgery time. Please be ready to share:  Your doctor's clinic name and phone number  Your medical, surgical and anesthesia history  A list of allergies and sensitivities  A list of medicines, including herbal treatments and over-the-counter drugs  Whether the patient has a legal guardian (ask how to send us the papers in advance)  Please tell us if you're pregnant--or if there's any chance you might be pregnant. Some surgeries may injure a fetus (unborn baby), so they require a pregnancy test. Surgeries that are safe for a fetus don't always need a test, and you can choose whether to have one.   If you have a child who's having surgery, please ask for a copy of Preparing for Your Child's Surgery.    Preparing for surgery  Within 30 days of surgery: Have a pre-op exam (sometimes called an H&P, or History and Physical). This can be done at a clinic or pre-operative center.  If you're having a , you may not need this exam. Talk to your care team.  At your pre-op exam, talk to your care team about all medicines you take. If you need to stop any medicines before surgery, ask when to start taking them again.  We do this for your safety. Many medicines can make you bleed too much during surgery. Some change how well surgery (anesthesia) drugs work.  Call your insurance company to let them know you're having surgery. (If you don't have insurance, call [phone].)  Call your clinic if there's any change in your health. This includes signs of a cold or flu (sore throat, runny nose, cough, rash, fever). It also includes a scrape or scratch near the surgery site.  If you have questions on the day of surgery, call your hospital or surgery center.  COVID testing  You may need to be tested for COVID-19 before  having surgery. If so, we will give you instructions.  Eating and drinking guidelines  For your safety: Unless your surgeon tells you otherwise, follow the guidelines below.  Eat and drink as usual until 8 hours before surgery. After that, no food or milk.  Drink clear liquids until 2 hours before surgery. These are liquids you can see through, like water, Gatorade and Propel Water. You may also have black coffee and tea (no cream or milk).  Nothing by mouth within 2 hours of surgery. This includes gum, candy and breath mints.  If you drink alcohol: Stop drinking it the night before surgery.  If your care team tells you to take medicine on the morning of surgery, it's okay to take it with a sip of water.  Preventing infection  Shower or bathe the night before and morning of your surgery. Follow the instructions your clinic gave you. (If no instructions, use regular soap.)  Don't shave or clip hair near your surgery site. We'll remove the hair if needed.  Don't smoke or vape the morning of surgery. You may chew nicotine gum up to 2 hours before surgery. A nicotine patch is okay.  Note: Some surgeries require you to completely quit smoking and nicotine. Check with your surgeon.  Your care team will make every effort to keep you safe from infection. We will:  Clean our hands often with soap and water (or an alcohol-based hand rub).  Clean the skin at your surgery site with a special soap that kills germs.  Give you a special gown to keep you warm. (Cold raises the risk of infection.)  Wear special hair covers, masks, gowns and gloves during surgery.  Give antibiotic medicine, if prescribed. Not all surgeries need antibiotics.  What to bring on the day of surgery  Photo ID and insurance card  Copy of your health care directive, if you have one  Glasses and hearing aides (bring cases)  You can't wear contacts during surgery  Inhaler and eye drops, if you use them (tell us about these when you arrive)  CPAP machine or  breathing device, if you use them  A few personal items, if spending the night  If you have . . .  A pacemaker, ICD (cardiac defibrillator) or other implant: Bring the ID card.  An implanted stimulator: Bring the remote control.  A legal guardian: Bring a copy of the certified (court-stamped) guardianship papers.  Please remove any jewelry, including body piercings. Leave jewelry and other valuables at home.  If you're going home the day of surgery  You must have a responsible adult drive you home. They should stay with you overnight as well.  If you don't have someone to stay with you, and you aren't safe to go home alone, we may keep you overnight. Insurance often won't pay for this.  After surgery  If it's hard to control your pain or you need more pain medicine, please call your surgeon's office.  Questions?   If you have any questions for your care team, list them here: _________________________________________________________________________________________________________________________________________________________________________ ____________________________________ ____________________________________ ____________________________________  For informational purposes only. Not to replace the advice of your health care provider. Copyright   2003, 2019 [facility]. All rights reserved. Clinically reviewed by Mony Beasley MD. [employer] [id number] - REV 07/21.

## 2022-07-26 NOTE — NURSING NOTE
"Chief Complaint   Patient presents with     Pre-Op Exam     Pilonidal cyst 8/1/22 Dr. Cruz [facility] [place]       Initial /64 (BP Location: Right arm, Patient Position: Sitting, Cuff Size: Adult Regular)   Pulse 81   Temp 98.1  F (36.7  C) (Tympanic)   Ht 1.854 m (6' 1\")   Wt 98 kg (216 lb)   SpO2 97%   BMI 28.50 kg/m   Estimated body mass index is 28.5 kg/m  as calculated from the following:    Height as of this encounter: 1.854 m (6' 1\").    Weight as of this encounter: 98 kg (216 lb).  Medication Reconciliation: complete  Zohreh Florez LPN  "

## 2022-07-31 RX ORDER — ONDANSETRON 2 MG/ML
4 INJECTION INTRAMUSCULAR; INTRAVENOUS ONCE
Status: CANCELLED | OUTPATIENT
Start: 2022-07-31 | End: 2022-07-31

## 2022-07-31 RX ORDER — CLINDAMYCIN PHOSPHATE 900 MG/50ML
900 INJECTION, SOLUTION INTRAVENOUS SEE ADMIN INSTRUCTIONS
Status: CANCELLED | OUTPATIENT
Start: 2022-08-01

## 2022-07-31 RX ORDER — CLINDAMYCIN PHOSPHATE 900 MG/50ML
900 INJECTION, SOLUTION INTRAVENOUS
Status: CANCELLED | OUTPATIENT
Start: 2022-08-01

## 2022-08-01 ENCOUNTER — ANESTHESIA (OUTPATIENT)
Dept: SURGERY | Facility: HOSPITAL | Age: 32
End: 2022-08-01
Payer: COMMERCIAL

## 2022-08-01 ENCOUNTER — TELEPHONE (OUTPATIENT)
Dept: SURGERY | Facility: OTHER | Age: 32
End: 2022-08-01

## 2022-08-01 NOTE — TELEPHONE ENCOUNTER
Patient was a no show for procedure scheduled with Dr. Cruz. Sending certified letter   tracking number : [phone]    Kristine Dugan LPN

## 2022-08-03 ENCOUNTER — HOSPITAL ENCOUNTER (INPATIENT)
Facility: HOSPITAL | Age: 32
LOS: 5 days | Discharge: HOME | DRG: 885 | End: 2022-08-08
Attending: EMERGENCY MEDICINE | Admitting: STUDENT IN AN ORGANIZED HEALTH CARE EDUCATION/TRAINING PROGRAM
Payer: COMMERCIAL

## 2022-08-03 ENCOUNTER — TRANSFERRED RECORDS (OUTPATIENT)
Dept: HEALTH INFORMATION MANAGEMENT | Facility: CLINIC | Age: 32
End: 2022-08-03

## 2022-08-03 DIAGNOSIS — F43.10 PTSD (POST-TRAUMATIC STRESS DISORDER): ICD-10-CM

## 2022-08-03 DIAGNOSIS — F22 PARANOIA (H): ICD-10-CM

## 2022-08-03 DIAGNOSIS — F33.2 SEVERE EPISODE OF RECURRENT MAJOR DEPRESSIVE DISORDER, WITHOUT PSYCHOTIC FEATURES (H): Primary | ICD-10-CM

## 2022-08-03 LAB
AMPHETAMINES UR QL: NOT DETECTED
BARBITURATES UR QL SCN: NOT DETECTED
BENZODIAZ UR QL SCN: NOT DETECTED
BUPRENORPHINE UR QL: NOT DETECTED
CANNABINOIDS UR QL: DETECTED
COCAINE UR QL SCN: NOT DETECTED
D-METHAMPHET UR QL: NOT DETECTED
METHADONE UR QL SCN: NOT DETECTED
OPIATES UR QL SCN: NOT DETECTED
OXYCODONE UR QL SCN: NOT DETECTED
PCP UR QL SCN: NOT DETECTED
PROPOXYPH UR QL: NOT DETECTED
SARS-COV-2 RNA RESP QL NAA+PROBE: NEGATIVE
TRICYCLICS UR QL SCN: NOT DETECTED

## 2022-08-03 PROCEDURE — C9803 HOPD COVID-19 SPEC COLLECT: HCPCS

## 2022-08-03 PROCEDURE — 124N000001 HC R&B MH

## 2022-08-03 PROCEDURE — 99285 EMERGENCY DEPT VISIT HI MDM: CPT | Performed by: EMERGENCY MEDICINE

## 2022-08-03 PROCEDURE — U0003 INFECTIOUS AGENT DETECTION BY NUCLEIC ACID (DNA OR RNA); SEVERE ACUTE RESPIRATORY SYNDROME CORONAVIRUS 2 (SARS-COV-2) (CORONAVIRUS DISEASE [COVID-19]), AMPLIFIED PROBE TECHNIQUE, MAKING USE OF HIGH THROUGHPUT TECHNOLOGIES AS DESCRIBED BY CMS-2020-01-R: HCPCS | Performed by: EMERGENCY MEDICINE

## 2022-08-03 PROCEDURE — 99285 EMERGENCY DEPT VISIT HI MDM: CPT

## 2022-08-03 PROCEDURE — 80306 DRUG TEST PRSMV INSTRMNT: CPT | Performed by: EMERGENCY MEDICINE

## 2022-08-03 RX ORDER — ACETAMINOPHEN 325 MG/1
650 TABLET ORAL EVERY 4 HOURS PRN
Status: DISCONTINUED | OUTPATIENT
Start: 2022-08-03 | End: 2022-08-08 | Stop reason: HOSPADM

## 2022-08-03 RX ORDER — LANOLIN ALCOHOL/MO/W.PET/CERES
3 CREAM (GRAM) TOPICAL
Status: DISCONTINUED | OUTPATIENT
Start: 2022-08-03 | End: 2022-08-08 | Stop reason: HOSPADM

## 2022-08-03 RX ORDER — MAGNESIUM HYDROXIDE/ALUMINUM HYDROXICE/SIMETHICONE 120; 1200; 1200 MG/30ML; MG/30ML; MG/30ML
30 SUSPENSION ORAL EVERY 4 HOURS PRN
Status: DISCONTINUED | OUTPATIENT
Start: 2022-08-03 | End: 2022-08-08 | Stop reason: HOSPADM

## 2022-08-03 RX ORDER — OLANZAPINE 10 MG/1
10 TABLET ORAL 3 TIMES DAILY PRN
Status: DISCONTINUED | OUTPATIENT
Start: 2022-08-03 | End: 2022-08-08 | Stop reason: HOSPADM

## 2022-08-03 RX ORDER — HYDROXYZINE HYDROCHLORIDE 25 MG/1
50 TABLET, FILM COATED ORAL EVERY 4 HOURS PRN
Status: DISCONTINUED | OUTPATIENT
Start: 2022-08-03 | End: 2022-08-08 | Stop reason: HOSPADM

## 2022-08-03 RX ORDER — OLANZAPINE 10 MG/2ML
10 INJECTION, POWDER, FOR SOLUTION INTRAMUSCULAR 3 TIMES DAILY PRN
Status: DISCONTINUED | OUTPATIENT
Start: 2022-08-03 | End: 2022-08-08 | Stop reason: HOSPADM

## 2022-08-03 ASSESSMENT — ACTIVITIES OF DAILY LIVING (ADL)
WALKING_OR_CLIMBING_STAIRS_DIFFICULTY: NO
HEARING_DIFFICULTY_OR_DEAF: NO
DOING_ERRANDS_INDEPENDENTLY_DIFFICULTY: NO
CONCENTRATING,_REMEMBERING_OR_MAKING_DECISIONS_DIFFICULTY: NO
TOILETING_ISSUES: NO
DIFFICULTY_COMMUNICATING: NO
ADLS_ACUITY_SCORE: 28
WEAR_GLASSES_OR_BLIND: NO
DIFFICULTY_EATING/SWALLOWING: NO
DRESSING/BATHING_DIFFICULTY: NO
FALL_HISTORY_WITHIN_LAST_SIX_MONTHS: NO
CHANGE_IN_FUNCTIONAL_STATUS_SINCE_ONSET_OF_CURRENT_ILLNESS/INJURY: NO
ADLS_ACUITY_SCORE: 28
ADLS_ACUITY_SCORE: 28
ADLS_ACUITY_SCORE: 35

## 2022-08-03 ASSESSMENT — ENCOUNTER SYMPTOMS
ENDOCRINE NEGATIVE: 1
CONSTITUTIONAL NEGATIVE: 1
EYES NEGATIVE: 1
GASTROINTESTINAL NEGATIVE: 1
MUSCULOSKELETAL NEGATIVE: 1
NEUROLOGICAL NEGATIVE: 1
RESPIRATORY NEGATIVE: 1
CARDIOVASCULAR NEGATIVE: 1

## 2022-08-03 NOTE — ED NOTES
Call to [facility] 8-    Reported.  Notification ID [id number]  Called Referral Desk  [phone] and talked to Thomas.  They do not have beds and have authorized us to admit him to our U.  [facility] supervisor aware.    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  He does know patient and is willing to help. [phone]    HEATHER Ledbetter

## 2022-08-03 NOTE — ED NOTES
Care Transitions focused note:      Call to [facility] and talked to nurse Lorin Cruz who stated that patient was a walk in today and was found to need a psych eval.  Pt was seen virtually by Dr Fuentes who is recommending a 72 hour hold and inpatient to start his medications which he has not been taking for several months.    Pt is a combat  with hx of PTSD, bipolar disorder and di.    Requested medications and MD notes from today's visit. Police are here with him right now. Pt is calm and cooperative. Denies to me that he is suicidal or homicidal. Not really sure why he is here. [place] [facility].  [employer] is aware of this.      Updated provider.  Gave fax with meds and MD notes to provider for review.    Likely patient will need admission to the mental health unit.    HEATHER Ledbetter

## 2022-08-03 NOTE — ED NOTES
Attempted to call report to  nurse. Writer was told RN was in another room and will call back for report.

## 2022-08-03 NOTE — PROGRESS NOTES
08/03/22 1849   Patient Belongings   Did you bring any home meds/supplements to the hospital?  No   Patient Belongings remains with patient;locker;sent to security per site process   Patient Belongings Remaining with Patient ring   Patient Belongings Put in Hospital Secure Location (Security or Locker, etc.) clothing;shoes;wallet;necklace;keys  (brown shoes, tank top, t shirt, shorts, underwear, socks, hat, sunglasses, cigarettes)   Belongings Search Yes   Clothing Search Yes   Second Staff Stoja   List items sent to safe: gold colored necklace with cross, 2 keys and fob, wallet, [place] drivers license, [facility] card,  ID, social security card, 2 hand gun permits, [employer] master card, best buy card, library card, [employer] card, [employer] V[employer] card, [employer] debit card, Hardware card, page master card,  Card, [employer] Card, [employer] gift card    All other belongings put in assigned cubby in belongings room.       I have reviewed my belongings list on admission and verify that it is correct.     Patient signature_______________________________    Second staff witness (if patient unable to sign) ______________________________       I have received all my belongings at discharge.    Patient signature________________________________    Mary   8/3/2022  6:52 PM

## 2022-08-03 NOTE — ED PROVIDER NOTES
"  History     Chief Complaint   Patient presents with     Mental Health Problem     HPI  Anton Donovan is a 31 year old male who [facility]  Patient has a history of posttraumatic stress disorder and paranoia.  Apparently his been off his medications for several months.  [facility] and it was recommended that he be brought here for admission and stabilization.  It is recommended that he get back on his medications.  Presently the patient is calm and cooperative.  He is conversant.  He denies suicidal or homicidal ideation.  He does however relate that he has been \"debriefed\" by the [facility].  Allergies:  Allergies   Allergen Reactions     Penicillins Unknown     Shellfish-Derived Products        Problem List:    Patient Active Problem List    Diagnosis Date Noted     Paranoia (H) 08/03/2022     Priority: Medium     Alcohol dependence in remission (H) 07/26/2022     Priority: Medium     PTSD (post-traumatic stress disorder) 05/31/2022     Priority: Medium     Anal fistula 08/24/2021     Priority: Medium     Added automatically from request for surgery [id number]          Past Medical History:    Past Medical History:   Diagnosis Date     Alcohol dependence (H)      Bipolar I disorder, most recent episode (or current) manic (H)      Chronic low back pain      Hereditary and idiopathic peripheral neuropathy      PTSD (post-traumatic stress disorder)      Sleep apnea      Tobacco use        Past Surgical History:    Past Surgical History:   Procedure Laterality Date     EXAM UNDER ANESTHESIA RECTUM N/A 9/7/2021    Procedure: EXAM UNDER ANESTHESIA, I&D of Abssess;  Surgeon: Kale Cruz MD;  Location: [facility] OR       Family History:    Family History   Problem Relation Age of Onset     Bladder Cancer Mother      Throat cancer Father        Social History:  Marital Status:  Legally  [3]  Social History     Tobacco " "Use     Smoking status: Current Every Day Smoker     Packs/day: 1.00     Types: Cigarettes     Start date: 9/20/2018     Smokeless tobacco: Former User     Quit date: 9/20/2018     Tobacco comment: declines quitplan referral 9/21/2021        Medications:    Multiple Vitamins-Minerals (MULTIVITAMIN ADULTS) TABS  mupirocin (BACTROBAN) 2 % external ointment          Review of Systems   Constitutional: Negative.    HENT: Negative.    Eyes: Negative.    Respiratory: Negative.    Cardiovascular: Negative.    Gastrointestinal: Negative.    Endocrine: Negative.    Genitourinary: Negative.    Musculoskeletal: Negative.    Neurological: Negative.    Psychiatric/Behavioral:        Please see history of chief complaint       Physical Exam   BP: 122/89  Pulse: 89  Temp: 98  F (36.7  C)  Resp: 18  SpO2: 98 %      Physical Exam 31-year-old young man who is awake alert oriented person place and time he is pleasant and cooperative with my exam.  HEENT normocephalic extraocular muscles intact pupils equally round and reactive light and oropharynx is clear.  Neck is supple his range of motion without pain lungs are clear bilaterally.  Heart maintains regular rate and rhythm.  S1 and S2 sounds are appreciated.  Abdomen is soft and nontender to mass no organomegaly rebound.  Extremes of range of motion 5/5 strength no edema.  Neurologic exam no focal cranial nerve deficit.  Psychiatric exam patient is awake and alert his affect is appropriate.  He denies suicidal or homicidal ideation currently.  The patient's psychiatric evaluation was faxed to us and will be scanned into the chart.  Patient relates to me that \"I feel fine but if psychiatry thinks I need to be admitted\" he is agreeable to admission    ED Course      Discussed the case with Yanique who was our behavioral health provider today.  We also reached out to the [facility].  They have no inpatient psychiatric beds and would approve the patient's admission here.  " Yanique is agreeable to admitting the patient to our behavioral health unit.                               Results for orders placed or performed during the hospital encounter of 08/03/22 (from the past 24 hour(s))   Urine Drugs of Abuse Screen    Narrative    The following orders were created for panel order Urine Drugs of Abuse Screen.  Procedure                               Abnormality         Status                     ---------                               -----------         ------                     Urine Drugs of Abuse Scr...[[id number]]                                                   Please view results for these tests on the individual orders.       Medications - No data to display    Assessments & Plan (with Medical Decision Making)     I have reviewed the nursing notes.    I have reviewed the findings, diagnosis, plan and need for follow up with the patient.  The plan is to admit the patient to behavioral health.    New Prescriptions    No medications on file       Final diagnoses:   PTSD (post-traumatic stress disorder)   Paranoia (H)       8/3/2022   [facility]     Maximo Hernandez,   08/03/22 1097

## 2022-08-03 NOTE — ED TRIAGE NOTES
[facility] and [facility] come to [facility] ER to be admitted to inpatient behavioral health. Pt has hx of PTSD and is delusional at times. Pt denies any suicidal ideation.     Duke Kennedy MSN, RN on 8/3/2022 at 2:47 PM

## 2022-08-04 PROBLEM — G89.29 CHRONIC PAIN: Status: ACTIVE | Noted: 2022-08-04

## 2022-08-04 PROBLEM — G89.29 CHRONIC LOW BACK PAIN: Status: ACTIVE | Noted: 2022-08-04

## 2022-08-04 PROBLEM — M54.50 CHRONIC LOW BACK PAIN: Status: ACTIVE | Noted: 2022-08-04

## 2022-08-04 PROBLEM — F48.9 DEFERRED DIAGNOSIS ON AXIS I: Status: ACTIVE | Noted: 2022-08-04

## 2022-08-04 PROBLEM — F32.9 MAJOR DEPRESSIVE DISORDER: Status: ACTIVE | Noted: 2022-08-04

## 2022-08-04 PROBLEM — R03.0 FINDING OF ABOVE NORMAL BLOOD PRESSURE: Status: ACTIVE | Noted: 2022-08-04

## 2022-08-04 PROBLEM — G60.9 IDIOPATHIC PERIPHERAL NEUROPATHY: Status: ACTIVE | Noted: 2022-08-04

## 2022-08-04 LAB
HOLD SPECIMEN: NORMAL

## 2022-08-04 PROCEDURE — 99223 1ST HOSP IP/OBS HIGH 75: CPT | Mod: AI | Performed by: NURSE PRACTITIONER

## 2022-08-04 PROCEDURE — 99222 1ST HOSP IP/OBS MODERATE 55: CPT | Performed by: NURSE PRACTITIONER

## 2022-08-04 PROCEDURE — 250N000013 HC RX MED GY IP 250 OP 250 PS 637: Performed by: NURSE PRACTITIONER

## 2022-08-04 PROCEDURE — 87476 LYME DIS DNA AMP PROBE: CPT | Performed by: NURSE PRACTITIONER

## 2022-08-04 PROCEDURE — 36415 COLL VENOUS BLD VENIPUNCTURE: CPT | Performed by: NURSE PRACTITIONER

## 2022-08-04 PROCEDURE — 124N000001 HC R&B MH

## 2022-08-04 RX ORDER — VARENICLINE TARTRATE 1 MG/1
TABLET, FILM COATED ORAL
Status: ON HOLD | COMMUNITY
End: 2022-08-04

## 2022-08-04 RX ORDER — RISPERIDONE 0.5 MG/1
0.5 TABLET ORAL
Status: ON HOLD | COMMUNITY
Start: 2022-06-08 | End: 2022-08-04

## 2022-08-04 RX ORDER — NICOTINE 21 MG/24HR
1 PATCH, TRANSDERMAL 24 HOURS TRANSDERMAL EVERY 24 HOURS
COMMUNITY
Start: 2022-06-14 | End: 2024-01-25

## 2022-08-04 RX ORDER — MULTIVITAMIN,THERAPEUTIC
1 TABLET ORAL DAILY
COMMUNITY
End: 2023-08-17

## 2022-08-04 RX ORDER — PROPRANOLOL HYDROCHLORIDE 10 MG/1
10 TABLET ORAL 3 TIMES DAILY PRN
Status: DISCONTINUED | OUTPATIENT
Start: 2022-08-04 | End: 2022-08-08 | Stop reason: HOSPADM

## 2022-08-04 RX ORDER — MULTIPLE VITAMINS W/ MINERALS TAB 9MG-400MCG
1 TAB ORAL DAILY
Status: DISCONTINUED | OUTPATIENT
Start: 2022-08-04 | End: 2022-08-08 | Stop reason: HOSPADM

## 2022-08-04 RX ORDER — NICOTINE 21 MG/24HR
1 PATCH, TRANSDERMAL 24 HOURS TRANSDERMAL DAILY
Status: DISCONTINUED | OUTPATIENT
Start: 2022-08-04 | End: 2022-08-08 | Stop reason: HOSPADM

## 2022-08-04 RX ORDER — PROPRANOLOL HYDROCHLORIDE 20 MG/1
20 TABLET ORAL 3 TIMES DAILY
COMMUNITY
End: 2023-08-17

## 2022-08-04 RX ORDER — ARIPIPRAZOLE 2 MG/1
2 TABLET ORAL DAILY
Status: DISCONTINUED | OUTPATIENT
Start: 2022-08-04 | End: 2022-08-05

## 2022-08-04 RX ADMIN — MULTIPLE VITAMINS W/ MINERALS TAB 1 TABLET: TAB at 13:19

## 2022-08-04 RX ADMIN — ARIPIPRAZOLE 2 MG: 2 TABLET ORAL at 13:19

## 2022-08-04 RX ADMIN — OLANZAPINE 10 MG: 10 TABLET ORAL at 02:31

## 2022-08-04 RX ADMIN — NICOTINE 1 PATCH: 21 PATCH, EXTENDED RELEASE TRANSDERMAL at 12:01

## 2022-08-04 ASSESSMENT — ACTIVITIES OF DAILY LIVING (ADL)
ADLS_ACUITY_SCORE: 28
HYGIENE/GROOMING: INDEPENDENT
ADLS_ACUITY_SCORE: 28
HYGIENE/GROOMING: INDEPENDENT
ADLS_ACUITY_SCORE: 28
LAUNDRY: UNABLE TO COMPLETE
ADLS_ACUITY_SCORE: 28
LAUNDRY: UNABLE TO COMPLETE
DRESS: INDEPENDENT;SCRUBS (BEHAVIORAL HEALTH)
ORAL_HYGIENE: INDEPENDENT
ADLS_ACUITY_SCORE: 28
ORAL_HYGIENE: INDEPENDENT
ADLS_ACUITY_SCORE: 28
DRESS: SCRUBS (BEHAVIORAL HEALTH);INDEPENDENT

## 2022-08-04 NOTE — PLAN OF CARE
Dominic with [facility] called unit. Per Dominic, she is available to provide any information needed.  Dominic provided work cell number [phone].

## 2022-08-04 NOTE — CARE PLAN
"Prior to Admission Medication Reconciliation:     Medications added:   [] None  [x] As listed below:    risperdal- pt reports not taking for months due to weight gain     Nicotine patch- pt smokes 1-2 packs per day. Wants to quit. Requested a patch.     chantix- pt has on hand for when he is ready to quit. Has not started PTA.     Medications deleted:   [x] None  [] As listed below:    Medications marked for review/removal by attending:  [] None  [x] As listed below:    Propranolol:Prescribed in . Pt has on hand and uses as needed for anxiety- \"feeling wound up\". Last took a dose yesterday, [facility]. Rx likely very . Marked for review by attending to determine medical necessity.     Changes made to existing medications:   [x] None  [] Updated strengths and frequencies to most current.   [] As listed below:    Pertinent notes/medications patient takes different than prescribed:     Pt has history of being on abilify injection- last took around 5 months ago- reports it caused lethargy and lack of motivation when it came to physical fitness so he doesn't want to be on it. [facility]     bactroban- recently prescribed for a tick bite- pt reports it was over $80 at the pharmacy so he never picked it up    Last times/dates taken verified with patient:  [x] Yes- completed myself  [] Prepared PTA medlist for review only. (will not be available to review personally)  [] Did not review with patient. Rx verification only. Review completed by nursing.    [] Nurse completed no changes made (double checked entries)  [] Unable to review with patient at this time:    Allergies listed at another location:  [x]Allergies match allergies listed in Epic  []Other allergies listed:    Allergy review:    []Did not review: reviewed by nursing  []Did not review: pt unable at this time  [x]Verified current existing allergies or NKDA: no changes made  []Patient confirmed current existing allergies and " new allergies added:    Medication reconciliation sources:   [x]Patient  []Patient family member/emergency contact: **  [x][facility] Report Review  [x]Epic Chart Review  [x]Care Everywhere review: see [facility] summarization for fill history  [x]Pharmacy med list/phone call: [facility]  Pharmacy went back to 2018 and could not see any propranolol  Nursing looked into it and it's all the way back from 2014- propranolol 20 mg TID (scheduled)- originally prescribed for anxiety/ptsd, stop date: [facility]  [x]Outside meds dispense report: see below  []Nursing home or Assisted Living MAR:  []Other: **    Pharmacy desired at discharge: [facility]    Is patient on coumadin?   [x]No  []Yes    Requests for consultation by provider or pharmacist:   [x] Patient understands why all of their meds were prescribed and how to take them. No questions.   [] Managing party has no questions.   [] Patient/ managing party has questions about the following:  [] Did not review with patient. Cannot assess.     Fill dates and reported compliancy:  [] Fill dates coincide with reported compliancy for all/most maintenance meds.   [x] Not applicable. Patient is not taking any maintenance medications at this time.   [] Fill dates do not coincide with compliancy with maintenance meds. See notes in PTA medlist and in comments.    [] Fill dates do not coincide with the following medications but pt reports compliancy:  [] Did not review with patient. Cannot assess.     Historian accuracy:  [] Excellent- alert and oriented, understands why meds were prescribed and how to take, able to answer specifics  [x] Good- alert and oriented, understands why meds were prescribed and how to take, some confusion   [] Fair- alert and oriented, doesn't know medications without list, cannot answer specifics about medications, but has a decent process for which to take at home  [] Poor- does not know medications, may not have a process to take at home, may be  cognitively unable to review at this time  []Medication management done by family member or facility, no concerns about historian accuracy.   [] Did not review with patient. Cannot assess.     Medication Management:  [x] Manages meds independently  [] Family member/ other party manages meds/assists:  [] Meds managed by staff at facility  [] Meds set up by home care, family/other party helps administer  [] Meds set up by home care, self administers  [] Did not review with patient. Cannot assess.     Other medications aside from PTA:  [x] Denies taking any medications aside from those listed in PTA meds  [] Reports taking another medication(s) but cannot recall the name(s)  [] Refuses to say.  [] Did not review with patient. Cannot assess.     Comments: none      Sherrie Randolph on 8/4/2022 at 9:22 AM       Notifying appropriate party of changes/additions/discrepancies:  []No pertinent changes made, notification not necessary.   [] Notified attending provider via text page/phone call  [] Notified attending provider in person  [] Notified pharmacy  [] Notified nurse  [x] Medications have not been reconciled by a provider yet, notification not necessary  [] Pt is not admitted to floor yet, PTA meds completed before admission.   Medications Prior to Admission   Medication Sig Dispense Refill Last Dose     multivitamin, therapeutic (THERA-VIT) TABS tablet Take 1 tablet by mouth daily   Past Week at Unknown time     mupirocin (BACTROBAN) 2 % external ointment Apply topically 3 times daily 30 g 0 Unknown at Unknown time     nicotine (NICODERM CQ) 21 MG/24HR 24 hr patch Place 1 patch onto the skin every 24 hours   Past Week at Unknown time     propranolol (INDERAL) 20 MG tablet Take 20 mg by mouth 3 times daily   8/3/2022 at Unknown time     risperiDONE (RISPERDAL) 0.5 MG tablet Take 0.5 mg by mouth at bedtime as needed, may repeat once (mood, insomnia)   More than a month at Unknown time     varenicline (CHANTIX) 1 MG tablet  Take 1/2 tablet by mouth every day for 3 days, then take 1/2 tablet twice daily for 4 days, then take 1 tablet twice a day to quit tobacco. Stop using tobacco on day 8. Call provider for refills. (Patient not taking: Reported on 8/4/2022)   Not Taking at Unknown time             Medication Dispense History (from 8/4/2021 to 8/3/2022)  Expand All  Collapse All    Ciprofloxacin HCl     Dispensed Days Supply Quantity Provider Pharmacy   [id number]   TAB [facility]2021 [id number] ...        HYDROcodone-Acetaminophen     Dispensed Days Supply Quantity Provider Pharmacy   HYDROCOD/ACETAM 5-325MG TAB 09/07/2021 3 18 Units Kale Cruz MD [id number] ...        Disclaimer    Certain dispenses may not be available or accurate in this report, including over-the-counter medications, low cost prescriptions, prescriptions paid for by the patient or non-participating sources, or errors in insurance claims information. The provider should independently verify medication history with the patient.    External Sources

## 2022-08-04 NOTE — H&P
[facility]    History and Physical  Medical Services       Date of Admission:  8/3/2022  Date of Service (when I saw the patient): 08/04/22    Assessment & Plan     Principal Problem:    PTSD (post-traumatic stress disorder)    Active Medical Problems:    Paranoia (H)    Pilonidal cyst- pt was medically cleared on 7/26 for surgery for 8/1. It is noted that he was a no show for the procedure. He states they never called him. He will need to follow up to get a new date scheduled for the surgery. Pt verbalized understanding.     Tick bite- pt report he pulled a tick off the center of his back 2 weeks ago. [facility] but [facility] not [facility] any [facility] and did not have any lab work completed. Round erythematous area around tick bite. Reports minimal itching. Denies any other rashes. Reports some left shoulder pain but reports this is due to working out and was occurring prior to the tick bite. Will order tick panel. Discussed with hospitalist BEE Coley and she recommended just doing the tick panel at this time. We are past the point for Prophylactic doxycyline.     Code Status: Full Code    Devora Oneil CNP    Primary Care Physician   Physician No Ref-Primary    Chief Complaint   Psych evaluation     History is obtained from the patient and medical chart     History of Present Illness   (per ED) Anton Donovan is a 31 year old male who is [facility]  Patient has a history of posttraumatic stress disorder and paranoia.  Apparently his been off his medications for several months.  He [facility] and it was recommended that he be brought here for admission and stabilization.  It is recommended that he get back on his medications.  Presently the patient is calm and cooperative.  He is conversant.  He denies suicidal or homicidal ideation.  He does however relate that he has been  "\"debriefed\" by the [facility].    Past Medical History    I have reviewed this patient's medical history and updated it with pertinent information if needed.   Past Medical History:   Diagnosis Date     Alcohol dependence (H)      Bipolar I disorder, most recent episode (or current) manic (H)      Chronic low back pain      Hereditary and idiopathic peripheral neuropathy      PTSD (post-traumatic stress disorder)      Sleep apnea      Tobacco use        Past Surgical History   I have reviewed this patient's surgical history and updated it with pertinent information if needed.  Past Surgical History:   Procedure Laterality Date     EXAM UNDER ANESTHESIA RECTUM N/A 9/7/2021    Procedure: EXAM UNDER ANESTHESIA, I&D of Abssess;  Surgeon: Kale Cruz MD;  Location: [facility] OR       Prior to Admission Medications   Prior to Admission Medications   Prescriptions Last Dose Informant Patient Reported? Taking?   multivitamin, therapeutic (THERA-VIT) TABS tablet Past Week at Unknown time  Yes Yes   Sig: Take 1 tablet by mouth daily   mupirocin (BACTROBAN) 2 % external ointment Unknown at Unknown time  No Yes   Sig: Apply topically 3 times daily   nicotine (NICODERM CQ) 21 MG/24HR 24 hr patch Past Week at Unknown time  Yes Yes   Sig: Place 1 patch onto the skin every 24 hours   propranolol (INDERAL) 20 MG tablet 8/3/2022 at Unknown time  Yes Yes   Sig: Take 20 mg by mouth 3 times daily      Facility-Administered Medications: None     Allergies   Allergies   Allergen Reactions     Penicillins Unknown     Shellfish-Derived Products        Social History   I have reviewed this patient's social history and updated it with pertinent information if needed. Anton Donovan  reports that he has been smoking cigarettes. He started smoking about 3 years ago. He has been smoking about 1.00 pack per day. He quit smokeless tobacco use about 3 years ago.    Family History   I have reviewed this patient's family history and updated it with " pertinent information if needed.   Family History   Problem Relation Age of Onset     Bladder Cancer Mother      Throat cancer Father        Review of Systems   CONSTITUTIONAL:  negative  EYES:  negative  HEENT:  negative  RESPIRATORY:  negative  CARDIOVASCULAR:  negative  GASTROINTESTINAL:  negative  GENITOURINARY:  negative  INTEGUMENT/BREAST:  Negative except pilonidal cyst  And tick bite cent of back  HEMATOLOGIC/LYMPHATIC:  negative  ALLERGIC/IMMUNOLOGIC:  negative  ENDOCRINE:  negative  MUSCULOSKELETAL:  negative  NEUROLOGICAL:  negative    Physical Exam   Temp: 96.9  F (36.1  C) Temp src: Temporal BP: 151/93 Pulse: 78   Resp: 16 SpO2: 97 % O2 Device: None (Room air)    Vital Signs with Ranges  Temp:  [96.9  F (36.1  C)-98.3  F (36.8  C)] 96.9  F (36.1  C)  Pulse:  [75-89] 78  Resp:  [16-18] 16  BP: (122-151)/(89-94) 151/93  SpO2:  [97 %-98 %] 97 %  207 lbs 14.4 oz    Constitutional: awake, alert, cooperative, no apparent distress, and appears stated age, vitals stable   Eyes: Lids and lashes normal, pupils equal, round and reactive to light, extra ocular muscles intact, sclera clear, conjunctiva normal  ENT: Normocephalic, without obvious abnormality, atraumatic, external ears without lesions, oral pharynx with moist mucous membranes, no erythema or exudates  Hematologic / Lymphatic: no cervical lymphadenopathy  Respiratory: No increased work of breathing, good air exchange, clear to auscultation bilaterally, no crackles or wheezing  Cardiovascular: Normal apical impulse, regular rate and rhythm, normal S1 and S2, no S3 or S4, and no murmur noted  GI: normal bowel sounds, soft, non-distended, non-tender, no masses palpated, no hepatosplenomegally  Genitounirinary: deferred  Skin: round erythematous area center of back, otherwise normal skin color, texture, turgor and no redness, warmth, or swelling  Musculoskeletal: There is no redness, warmth, or swelling of the joints.  Full range of motion noted.     Neurologic: Awake, alert, oriented to name, place and time.   Neuropsychiatric: General: normal, calm and normal eye contact    Data   Data reviewed today:   No lab results found in last 7 days.    No results found for this or any previous visit (from the past 24 hour(s)).

## 2022-08-04 NOTE — DISCHARGE INSTRUCTIONS
Behavioral Discharge Planning and Instructions    Summary: Anton Donovan is a 31 year old male who [facility]  Patient has a history of posttraumatic stress disorder and paranoia.  Apparently his been off his medications for several months.    Main Diagnosis: Paranoia, PTSD     Health Care Follow-up:     [facility]  [address], [address]  [place] [address]  Appointment: 08/18/2022 @ 10:00 am for Medication Management - With Dr. Freire.   Directions  Phone numbers  Main phone: [phone]  Mental health care: [phone]  Clinical hours  Mon: 7:30 a.m. to 4:30 p.m.  Tue: 7:30 a.m. to 4:30 p.m.  Wed: 7:30 a.m. to 4:30 p.m.  Thu: 7:30 a.m. to 4:30 p.m.  Fri: 7:30 a.m. to 4:30 p.m.  Sat: Closed  Sun: Closed    [facility]  Health & medical  [address] [address] [phone]  range.[place]    Maximilian at [facility] [place] can be contacted.  He does know patient and is willing to help. [phone]       They will be contacting the [place] at discharge to review eligible services available to him and what he may qualify for.   [facility]  Physical Address View Map  [address] [address]  Phone: [phone]  Fax: [phone]  Hours  Monday - Friday  8 a.m. - 4:30 p.m.  (closed noon - 1 p.m.)      [facility]  [email or web address]  [facility] [address] [address] [phone]    Meeting Location & Time    [facility] [address] [address]  [facility]    6:00 PM 2nd Tuesday    [facility] [place] [facility]  [address] [facility] [address]  [place] [address]  [phone]) and [phone], or by email at [email or web address].   Hours of operation are Monday - Friday, 9 am - 9 pm EST.    [facility]  [facility] [phone]  [facility] [place] [facility] [phone]   [facility]  Community Based Outpatient Clinic  [phone]  [facility] Community Based outpatient Clinic [phone]  [facility] [phone]  [facility] [phone]  [facility] [phone]      211 Crisis Response Team  Services   Address: [address] [place] [address]  Phone: [phone]   Fax: [phone]  Email: [email or web address]    Hours  Monday - Friday  8 a.m. - 4:30 p.m.  (closed noon - 1 p.m.)  NUMBERS TO CALL IN CRISIS    National Suicide Prevention Lifeline ([facility])  1-656.341.7095    Crisis Text Line ([place])  Text  HOME  to 251019    Mental Health Crisis Line ([place], [place])  [phone]    [facility] Mobile Crisis ([place], [place])   [phone]      If you are feeling very unsafe, call 911 or bring yourself to the Emergency Room        Counseling in [place]:  Jeffrey Solano           [phone]  [facility]    [facility]      [phone]  [facility]     Bhakti Chen [facility] (young kids)    [phone]   Magdalena Crespo [facility] (older kids & adults)  [phone]   Al Wong [facility]      [phone]  [facility]       [phone]   Clyde Schumacher MS, LP   Patricia Hawkins MA, [facility]   Jeffrey Javier MS psychotherapist   Kaci Arroyo MS, [facility]   Zaira Carrasquillo, [facility], counselor   Abby Schumacher [facility], counselor  Yissel        [phone]  Thomas Quevedo, counseling  Dr Orquidea Sharma, psychiatry  Chanell Finley, counseling  Hermes Ochoa, counseling  Gee Lopez, counseling  Parisa Ferrer, psychiatry   [facility]       [phone]   Essie Baca MA, LMFT, RPFS   Ambreen Shen MSW, [facility]          [phone]  [facility]      [phone]   Abby Owens MS, [facility]          [phone]        Margarito Emery MSW, LICSW , C-BEATRIZ Vela MSW, LGSW                                                    Keagan Lik  [facility]      Airam Vaughan MS, LPC   Lorena Leaders   [facility]                [phone]      Joyce Gil PsyD     Jessica Ryder PsyD, owner      Benita Gonzalez PsyD    Rad RUIZ, BEATRIS Smith PhD   Flakita Falk MSW, LICSW [facility]       [phone]   Idalia Chang [facility]   Travis Josue APRN, CNP,     Bakari Barnes MSW, [facility] (adolescents)   Jackie Grinnel APRN, CNP (Hib via telehealth; adolescents)   Susy RYAN, CNP (Hib via telehealth)   Obi Anderson MA, LPC, BCIA (Hib via telehealth)   Silvia Ba [facility] MSW, [facility]   Devora Nuñez [facility]   Tex Vidal DNP, APRN, CNP   Radha Bronson RN, BSN   Karime Ko RN-BC, BSN (Hib via telehealth)  [facility]         [phone]   Nereida Welch, ARLEN, [facility]           [phone]   Estevan Clark MA, LMFT   Janee Zuleta MS RN [facility] NP   Abby Ward, [facility]         [phone]  [facility]       [phone]   [facility]   Gema Aparicio (to be LPC)   Neo Cooper (to be LPC)      Counseling in [place]:  [facility]       [phone]   mental health & CD counseling  Obi Orellana       [phone]  [facility]       [phone]  [facility]        [phone]   Essie Forretser  [facility]       [facility]              [phone]   can do weekends and evenings   DeLbear Walters, MSW, [facility], LCSW, CCTP    Kleber Scott MA, LMFT, [facility]  [place]       evenings, weekends  [phone]      Counseling in [place]:  [facility]         [phone]   Nereida Welch, MSN, [facility]-BC   Gisell Carmen MA, [facility]  [facility]      [phone]  [facility]       [phone]   Rhoda Lindo [facility]  [facility]       Vanita Ryan       [phone]  [facility]    [address]  "[address]     Rom Hanson      [phone]  [facility]     Hermes Valerio MS Ed, LMFT    [phone]    (kids) denotes providers who also see kids     Attend all scheduled appointments with your outpatient providers. Call at least 24 hours in advance if you need to reschedule an appointment to ensure continued access to your outpatient providers.     Major Treatments, Procedures and Findings:  You were provided with: a psychiatric assessment, assessed for medical stability, medication evaluation and/or management, group therapy, family therapy, individual therapy, CD evaluation/assessment, milieu management, and medical interventions    Symptoms to Report: feeling more aggressive, increased confusion, losing more sleep, mood getting worse, or thoughts of suicide    Early warning signs can include: increased depression or anxiety sleep disturbances increased thoughts or behaviors of suicide or self-harm  increased unusual thinking, such as paranoia or hearing voices        Resources:   Crisis Intervention: 865.717.6354 or 966-515-7291 (TTY: 518.767.7546).  Call anytime for help.  National [place]lewellyn on Mental Illness (www.mn.preethi.org): 828.316.5826 or 216-099-7571.  [place] Association for Children's Mental Health (www.macmh.org): 519.327.7913.  Alcoholics Anonymous (www.alcoholics-anonymous.org): Check your phone book for your local chapter.  Suicide Awareness Voices of Education (SAVE) (www.save.org): 260-023-MYJM (7408)  National Suicide Prevention Line (www.mentalhealthmn.org): 947-611-GIXR (3624)  Mental Health Consumer/Survivor Network of [place] (www.mhcsn.net): 724.379.7116 or 367-722-9426  Mental Health Association of [place] (www.mentalhealth.org): 759.818.4709 or 213-766-8034  Self- Management and Recovery Training., SMART-- Toll free: 218.946.9428  www.localbacon.stylemarks  Text 4 Life: txt \"LIFE\" to 79283 for immediate support and crisis intervention  Crisis text line: Text \"[place]\" to 983624. Free, confidential, " "24/7.  Crisis Intervention: 294.601.7581 or 503-820-0159. Call anytime for help.     General Medication Instructions:   See your medication sheet(s) for instructions.   Take all medicines as directed.  Make no changes unless your doctor suggests them.   Go to all your doctor visits.  Be sure to have all your required lab tests. This way, your medicines can be refilled on time.  Do not use any drugs not prescribed by your doctor.  Avoid alcohol.    Advance Directives:   Scanned document on file with [employer]? No scanned doc  Is document scanned? Pt states no documents  Honoring Choices Your Rights Handout: Informed and given  Was more information offered? Pt declined    The Treatment team has appreciated the opportunity to work with you. If you have any questions or concerns about your recent admission, you can contact the unit which can receive your call 24 hours a day, 7 days a week. They will be able to get in touch with a Provider if needed. The unit number is [phone].    Range Area:  [facility], Crisis stabilization [facility]- [phone]  [facility] Crisis Line: 1-956.502.2880  Adv[facility]ates For Family Peace: 164-8998  Sexual Assault Program [facility]: 177.429.4085 or 1-182.458.6151  [facility] Women's Program: 1-878.990.4343 Ext: 279       Calls answered Mon-Fri-8:00 am--4:30 pm    [place]:  [facility] for Family Peace: 7-033-583-6332  [facility] - [phone]  [facility] first call for help: 7-639-491-0811  [facility] Counseling Crisis Center:  (613) 217-9438    [place] Area:  Warm Line: 1-635.247.3005       Calls answered Tuesday--Saturday 4:00 pm--10:00 pm  [facility] Crisis Line - [phone]  [facility] 319-210-8282    [place] Statewide:  [place] Crisis and Referral Services: 2-809-445-1259  National Suicide Prevention Lifeline: 9-158-908-TALK (4649)   - ejq0cikz- Text \"Life\" to 85114  First Call for Help: 2-1-1  [facility] Helpline- 5-528-MYBD-HELP   Crisis " Text Line: Text  [place]  to 352096

## 2022-08-04 NOTE — PROGRESS NOTES
Social Service Psychosocial Assessment  Presenting Problem:    Anton Donovan is a 31 year old male who is [facility]  Patient has a history of posttraumatic stress disorder and paranoia.  Apparently his been off his medications for several months.  Maximilian at [facility] can be contacted.  He does know patient and is willing to help. [phone]     Marital Status:   Not    Spouse / Children:    Has a 6 year old son   Psychiatric [place] HX:   [facility]   Suicide Risk Assessment: The patient denies SI and HI for admit, denies SI and HI in the past, denies SI and HI for today.   Access to Lethal Means (explain):   The patient deneis access to lethal means.   Family Psych HX:   Unknown   A & Ox:   X 4   Medication Adherence:   Unknown please refer to H&P   Medical Issues:  Unknown please refer to H&P  Visual -Motor Functioning:   Good, no issues noticed at this time.   Communication Skills /Needs:  Good, no issues noticed at this time.   Ethnicity:   White     Spirituality/Buddhism Affiliation:   Marcus   Clergy Request:  Yes    History:   Yes [place] [employer]    Living Situation:   Lives in [facility] [place]  ADL s:  Independently   Education:  Graduated High School, went to [employer]   Financial Situation:   VA income   Occupation:  Unemployed at the moment.   Leisure & Recreation:  Out doors   Childhood History:   Patient did not answer this question   Trauma Abuse HX:  PTSD - [place]    Relationship / Sexuality:  Heterosexual   Substance Use/ Abuse:   Past THC and alcohol use. Has used pain killers in the past to self medicate PTSD.   Chemical Dependency Treatment HX:     Past THC and alcohol use. Has used pain killers in the past to self medicate PTSD.   Legal Issues:   None  Significant Life Events:  Graduating High School. Serving in the [employer], having his son.   Strengths:   Is in a safe place, has a home, is open to  services, has VA services.   Challenges /Limitation:   Current paranoia and PTSD, lack of community resources.   Patient Support Contact (Include name, relationship, number, and summary of conversation):  RICO signed for his son's mom Nelda FRANK, His Dad Dominic PENALOZA, and Mom Raya DELACRUZ, No numbers on RICO.   Interventions:        Vulnerable Adult/Child Report NA     Community-Based Programs [facility] NAPOLEON Turner is providing the patient with [facility] information.     Medical/Dental Care -     Home Care NA     CD Evaluation/Rule 25/Aftercare - Not interested at this time  -  will provide resources information.     Medication Management - [facility]     Individual Therapy - [facility]     Clergy Request Yes     Housing/Placement Lives in [facility]     Case Management - Might benefit     Insurance Coverage - VA     Financial Assistance Would benefit     Commit/Edmondson Screening ???    Suicide Risk Assessment - The patient denies SI and HI for admit, denies SI and HI in the past, denies SI and HI for today.    High Risk Safety Plan Talk to supports; Call crisis lines; Go to local ER if feeling suicidal.  HEATHER Rivas  8/4/2022  9:28 AM

## 2022-08-04 NOTE — H&P
"[facility] PSYCHIATRY   HISTORY AND PHYSICAL     ADMISSION DATA     Anton Donovan MRN# [id number]   Age: 31 year old YOB: 1990     Date of Admission: 8/3/2022  Primary Physician: No Ref-Primary, Physician        CHIEF COMPLAINT   Paranoid delusions       HISTORY OF PRESENT ILLNESS     Anton Donovan is a 31 year old male who is brought to the ED by Johnny ODOM from the [facility].  Patient has a history of posttraumatic stress disorder and paranoia.  Apparently his been off his medications for several months.  He [facility] and it was recommended that he be brought here for admission and stabilization.  It is recommended that he get back on his medications.  Presently the patient is calm and cooperative.  He is conversant.  He denies suicidal or homicidal ideation.  He does however relate that he has been \"debriefed\" by the [facility].       PSYCHIATRIC HISTORY     Patient receives care [facility], follows Dr. Fuentes for psychiatry.  He reports having psychotherapy in the past as well and would like seeing someone again.  He is 90% disabled due to PTSD, IEF/IOF combat  with a history of multiple previous diagnoses including PTSD, Bipolar affective, \"paranoia\", cannabis and alcohol use disorders.  Patient last hospitalized for mental health last Fall in [place].  According to Dr. Fuentes from yesterday, patient presented as a walk-in to the clinic, slipped the RN a note about being \"debriefed\" and has been off his medications since January - she recommends inpatient.       SUBSTANCE USE HISTORY   History   Drug Use Not on file       Social History    Substance and Sexual Activity      Alcohol use: Not on file      History   Smoking Status     Current Every Day Smoker     Packs/day: 1.00     Types: Cigarettes     Start date: 9/20/2018   Smokeless Tobacco     Former User     Quit date: 9/20/2018     Comment: declines quitplan " "referral 9/21/2021     Patient reports about 2 months ago he started drinking alcohol again, reporting \"a couple beers\" and not every night.    UTOX + for cannabis, denies use of other illicit drugs.       SOCIAL HISTORY   Social History     Socioeconomic History     Marital status: Legally      Spouse name: Not on file     Number of children: Not on file     Years of education: Not on file     Highest education level: Not on file   Occupational History     Not on file   Tobacco Use     Smoking status: Current Every Day Smoker     Packs/day: 1.00     Types: Cigarettes     Start date: 9/20/2018     Smokeless tobacco: Former User     Quit date: 9/20/2018     Tobacco comment: declines quitplan referral 9/21/2021   Substance and Sexual Activity     Alcohol use: Not on file     Drug use: Not on file     Sexual activity: Not on file   Other Topics Concern     Not on file   Social History Narrative     Not on file     Social Determinants of Health     Financial Resource Strain: Not on file   Food Insecurity: Not on file   Transportation Needs: Not on file   Physical Activity: Not on file   Stress: Not on file   Social Connections: Not on file   Intimate Partner Violence: Not on file   Housing Stability: Not on file     Patient , they have a 6 year old son who lives with mother.  He lives in a house in [place] with his 2 dogs.  Combat        FAMILY HISTORY   Family History   Problem Relation Age of Onset     Bladder Cancer Mother      Throat cancer Father          PAST MEDICAL HISTORY   Past Medical History:   Diagnosis Date     Alcohol dependence (H)      Bipolar I disorder, most recent episode (or current) manic (H)      Chronic low back pain      Hereditary and idiopathic peripheral neuropathy      PTSD (post-traumatic stress disorder)      Sleep apnea      Tobacco use        Past Surgical History:   Procedure Laterality Date     EXAM UNDER ANESTHESIA RECTUM N/A 9/7/2021    Procedure: EXAM UNDER " ANESTHESIA, I&D of Abssess;  Surgeon: Kale Cruz MD;  Location: [facility]       Penicillins and Shellfish-derived products     MEDICATIONS   Prior to Admission medications    Medication Sig Start Date End Date Taking? Authorizing Provider   multivitamin, therapeutic (THERA-VIT) TABS tablet Take 1 tablet by mouth daily   Yes Reported, Patient   mupirocin (BACTROBAN) 2 % external ointment Apply topically 3 times daily 7/26/22  Yes Smitha Em, NP   nicotine (NICODERM CQ) 21 MG/24HR 24 hr patch Place 1 patch onto the skin every 24 hours 6/14/22  Yes Reported, Patient   propranolol (INDERAL) 20 MG tablet Take 20 mg by mouth 3 times daily   Yes Reported, Patient   risperiDONE (RISPERDAL) 0.5 MG tablet Take 0.5 mg by mouth at bedtime as needed, may repeat once (mood, insomnia) 6/8/22 8/4/22  Reported, Patient        PHYSICAL EXAM/ROS     I have reviewed the physical exam as documented by the medical team and agree with findings and assessment and have no additional findings to add at this time. The review of systems is negative other than noted in the HPI.       LABS   Recent Results (from the past 24 hour(s))   Asymptomatic COVID-19 Virus (Coronavirus) by PCR Nasopharyngeal    Collection Time: 08/03/22  3:09 PM    Specimen: Nasopharyngeal; Swab   Result Value Ref [facility]    SARS CoV2 PCR Negative Negative   Urine Drugs of Abuse Screen Panel 13    Collection Time: 08/03/22  3:09 PM   Result Value Ref [facility]    Cannabinoids (86-ppm-3-carboxy-9-THC) Detected (A) Not Detected, Indeterminate    Phencyclidine Not Detected Not Detected, Indeterminate    Cocaine (Benzoylecgonine) Not Detected Not Detected, Indeterminate    Methamphetamine (d-Methamphetamine) Not Detected Not Detected, Indeterminate    Opiates (Morphine) Not Detected Not Detected, Indeterminate    Amphetamine (d-Amphetamine) Not Detected Not Detected, Indeterminate    Benzodiazepines (Nordiazepam) Not Detected Not Detected, Indeterminate    Tricyclic  "Antidepressants (Desipramine) Not Detected Not Detected, Indeterminate    Methadone Not Detected Not Detected, Indeterminate    Barbiturates (Butalbital) Not Detected Not Detected, Indeterminate    Oxycodone Not Detected Not Detected, Indeterminate    Propoxyphene (Norpropoxyphene) Not Detected Not Detected, Indeterminate    Buprenorphine Not Detected Not Detected, Indeterminate         MENTAL STATUS EXAM   Vitals: /93 (BP Location: Right arm)   Pulse 78   Temp 96.9  F (36.1  C) (Temporal)   Resp 16   Ht 1.854 m (6' 1\")   Wt 94.3 kg (207 lb 14.4 oz)   SpO2 97%   BMI 27.43 kg/m      Appearance:  awake, alert  Attitude:  cooperative  Eye Contact:  good  Mood:  \"feeling lonely, otherwise good\"  Affect:  intensity is flat  Speech:  clear, coherent - slight pressure  Psychomotor Behavior:  no evidence of tardive dyskinesia, dystonia, or tics  Thought Process:  denies A/V hallucination, delusions present  Associations:  loosening of associations present  Thought Content:  no evidence of suicidal ideation or homicidal ideation  Insight:  limited  Judgment:  fair  Oriented to:  time, person, and place  Attention Span and Concentration:  fair  Recent and Remote Memory:  fair  Language: Able to name objects, Able to repeat phrases and Able to read and write  Fund of Knowledge: appropriate  Muscle Strength and Tone: normal  Gait and Station: Normal       ASSESSMENT     Patient admitted to inpatient BHU due to increasing paranoia and delusions past few months.  He has history of bipolar and PTSD and [facility]  He reports feeling \"lonely at home, but otherwise good\".  He denies hallucination, denies suicidal or homicidal thoughts, slept good last night - otherwise has not been sleeping, and does not want to talk about the nature of his \"delusions\", which he admits he was paranoid about a \"few things\" and now does not want to talk about it.  He reports being \"cooped up\" in his house all winter and " "recently has been taking care of down trees with a friend, admits to drinking alcholol again, but \"only a couple beers and not every night\".  Patient is agreeable to restart Abilify which he took for quite some time.  He stopped this in January.  He also finds propranolol as needed helps with anxiety and calms him down.  Patient does not want to start lithium or have a long acting injection.       DIAGNOSIS     1.  Bipolar Affective Disorder, most recent manic  2.  PTSD, combat        PLAN     Location: Unit 5  Legal Status: Orders Placed This Encounter      Voluntary    Safety Assessment:    Behavioral Orders   Procedures     Code 1 - Restrict to Unit     Routine Programming     As clinically indicated     Status 15     Every 15 minutes.      PTA medications held:     - risperidone discontinued by patient request    PTA medications continued/changed:     - Continue propranolol 10mg prn    New medications initiated:     - Abilify 2mg daily - patient tolerates this medication and has stabilized with in the past    Programming: Patient will be treated in a therapeutic milieu with appropriate individual and group therapies. Education will be provided on diagnoses, medications, and treatments.     Medical diagnoses:  Per medicine    Consult: none  Tests: FNP ordered tick panel (see her notes)    Anticipated LOS: 2-5 days  Disposition: Home when stable       ATTESTATION      Janee Lugo, ROSANNA CNP                 "

## 2022-08-04 NOTE — PROGRESS NOTES
"The patient was newly arrived on the unit. He came to group and began coloring and talking with this writer for group. He left to lie down but soon returned to the group room to speak with me. We had to  converse in his room because the laptop I was using he thought was a listening device. The patient stated we needed to get a message to the [employer]. Patient stated that he himself was not a  [employer]. He had earlier requested a Bible which was given to him. He made several comments about Mandaen, including God was with him because there was a G on the coloring sheet that was the same color as his sweatshirt. He stated that he could not tell me more because he believed my panic button to call extra staff was also a listening device. This writer explained to the patient that I was unable to keep what he was telling me confidential. The patient understood that and said \"do what you have to do.\" The patient then said he was going to rest in his room the next 24 hours to pray and meditate. This writer informed the patient that staff would be checking on him. The patient replied that it was OK. The patient wants to meet with this writer again tomorrow.   "

## 2022-08-04 NOTE — PLAN OF CARE
"PERI SARAVIA RN  8/4/2022  7:39 AM  Face to face shift report received from TIAN Waldron. Rounding completed, pt observed.     1411-Lab was drawn and pt tolerated well.  Attended all groups and is very appreciative to staff-he wrote a thank you letter.  Started to feel hopeful after talking with Trino .    0902-Reports sleeping well last night.  Anxiety and depression are \"controlled\" per pt.  Denies SI, HI, and hallucinations.  Has pain in left shoulder but declines interventions.  Pt states \"I just want to get back on my meds.  I would also like privacy today\".  Informed pt providers and social workers will be in to see him.  Reassured pt to let writer know if he needs anything or starts to feel worse in any way.    Problem: Behavioral Health Plan of Care  Goal: Patient-Specific Goal (Individualization)  Description: Pt will be free of delusional thinking at discharge.  Pt will attend group.  Pt will eat at least 50% of meals.  Pt will take medications as prescribed.  Pt will follow treatment tream recommendations.  Outcome: Ongoing, Progressing     Problem: Thought Process Alteration  Goal: Optimal Thought Clarity  Outcome: Ongoing, Progressing   Goal Outcome Evaluation:  Face to face end of shift report communicated to oncoming shift RN.                            "

## 2022-08-04 NOTE — PLAN OF CARE
SHIFT SUMMARY:  Denies suicidal or homicidal ideation, auditory or visual hallucinations and pain. Pleasant and cooperative. No paranoid or delusional statements made. Attending group.       Problem: Thought Process Alteration  Goal: Optimal Thought Clarity  Outcome: Ongoing, Not Progressing     Problem: Behavioral Health Plan of Care  Goal: Patient-Specific Goal (Individualization)  Description: Pt will be free of delusional thinking at discharge.  Pt will attend group.  Pt will eat at least 50% of meals.  Pt will take medications as prescribed.  Pt will follow treatment tream recommendations.  Outcome: Ongoing, Progressing  Goal: Absence of New-Onset Illness or Injury  Outcome: Ongoing, Progressing   Goal Outcome Evaluation:    Plan of Care Reviewed With: patient                  TeleHospitalist

## 2022-08-04 NOTE — PLAN OF CARE
"ADMISSION NOTE    Reason for admission delusional thought process.  Safety concerns none.  Risk for or history of violence none.   Full skin assessment: WNL    Patient arrived on unit from [facility] ED accompanied by security on 8/3/2022  17:09 PM.   Status on arrival: calm and cooperative  /94   Pulse 75   Temp 98.3  F (36.8  C) (Temporal)   Resp 16   Ht 1.854 m (6' 1\")   Wt 94.3 kg (207 lb 14.4 oz)   SpO2 98%   BMI 27.43 kg/m    Patient given tour of unit and Welcome to  unit papers given to patient, wanding completed, belongings inventoried, and admission assessment completed.   Patient's legal status on arrival is voluntary. Appropriate legal rights discussed with and copy given to patient. Patient Bill of Rights discussed with and copy given to patient.   Patient denies SI, HI, and thoughts of self harm and contracts for safety while on unit.      Bennett Gutiérrez RN  8/3/2022  7:10 PM      SHIFT SUMMARY:  Calm and cooperative. Paranoid statements made to the [facility] in group: See his note. Denies suicidal and homicidal ideation, auditory and visual hallucinations and pain.       Problem: Behavioral Health Plan of Care  Goal: Patient-Specific Goal (Individualization)  Description: Pt will be free of delusional thinking at discharge.  Pt will attend group.  Pt will eat at least 50% of meals.  Pt will take medications as prescribed.  Pt will follow treatment tream recommendations.  Outcome: Ongoing, Not Progressing     Problem: Thought Process Alteration  Goal: Optimal Thought Clarity  Outcome: Ongoing, Not Progressing     Problem: Behavioral Health Plan of Care  Goal: Adheres to Safety Considerations for Self and Others  Outcome: Ongoing, Progressing  Goal: Absence of New-Onset Illness or Injury  Outcome: Ongoing, Progressing     Problem: Plan of Care - These are the overarching goals to be used throughout the patient stay.    Goal: Readiness for Transition of Care  Intervention: Mutually Develop " Transition Plan  Recent Flowsheet Documentation  Taken 8/3/2022 1900 by Bennett Gutiérrez, RN  Equipment Currently Used at Home: none   Goal Outcome Evaluation:

## 2022-08-04 NOTE — PLAN OF CARE
Problem: Behavioral Health Plan of Care  Goal: Patient-Specific Goal (Individualization)  Description: Pt will be free of delusional thinking at discharge.  Pt will attend group.  Pt will eat at least 50% of meals.  Pt will take medications as prescribed.  Pt will follow treatment tream recommendations.  8/4/2022 0328 by Vanita Gardner, RN  Outcome: Ongoing, Progressing  Note: Report received from Bennett. Rounding complete. Pt observed sitting in the lounge socializing with peer. They were appropriate and no issues were noted.     0245- Pt requested to speak with this writer via  who stated pt needed to talk and pt had become tearful. Writer went in and spoke with pt for right around 30 min. Pt verbalized being stressed and feeling like he just needs to vent. Pt stated he would also like to get a medicinal card for marijuana as he feels that it helps him sleep and eat better. He did also have c/o left shoulder pain that sounded as if it was muscular in nature. Pt declined offer for tylenol. Pt is sounding more hopeful and is looking forward to getting better and speaking with the provider in the morning.     0315- Pt stated that he thinks he is going to try to sleep and lays down.    Pt slept approx 2.5 hours this NOC shift.       Problem: Thought Process Alteration  Goal: Optimal Thought Clarity  8/4/2022 0328 by Vanita Gardner, TIAN  Outcome: Ongoing, Progressing  Note: Pt does verbalize being concerned about talking about a lot because he worries that their may be listening devices.   Goal Outcome Evaluation:

## 2022-08-05 LAB
HOLD SPECIMEN: NORMAL
HOLD SPECIMEN: NORMAL

## 2022-08-05 PROCEDURE — 250N000013 HC RX MED GY IP 250 OP 250 PS 637: Performed by: NURSE PRACTITIONER

## 2022-08-05 PROCEDURE — 99232 SBSQ HOSP IP/OBS MODERATE 35: CPT | Performed by: NURSE PRACTITIONER

## 2022-08-05 PROCEDURE — 87798 DETECT AGENT NOS DNA AMP: CPT | Performed by: NURSE PRACTITIONER

## 2022-08-05 PROCEDURE — 124N000001 HC R&B MH

## 2022-08-05 PROCEDURE — 99233 SBSQ HOSP IP/OBS HIGH 50: CPT | Performed by: NURSE PRACTITIONER

## 2022-08-05 PROCEDURE — 36415 COLL VENOUS BLD VENIPUNCTURE: CPT | Performed by: NURSE PRACTITIONER

## 2022-08-05 RX ORDER — QUETIAPINE FUMARATE 50 MG/1
50 TABLET, FILM COATED ORAL 2 TIMES DAILY PRN
Status: DISCONTINUED | OUTPATIENT
Start: 2022-08-05 | End: 2022-08-06

## 2022-08-05 RX ORDER — LIDOCAINE 4 G/G
1 PATCH TOPICAL
Status: DISCONTINUED | OUTPATIENT
Start: 2022-08-05 | End: 2022-08-08 | Stop reason: HOSPADM

## 2022-08-05 RX ORDER — IBUPROFEN 600 MG/1
600 TABLET, FILM COATED ORAL EVERY 6 HOURS PRN
Status: DISCONTINUED | OUTPATIENT
Start: 2022-08-05 | End: 2022-08-08 | Stop reason: HOSPADM

## 2022-08-05 RX ORDER — ARIPIPRAZOLE 5 MG/1
5 TABLET ORAL DAILY
Status: DISCONTINUED | OUTPATIENT
Start: 2022-08-06 | End: 2022-08-08 | Stop reason: HOSPADM

## 2022-08-05 RX ADMIN — ACETAMINOPHEN 650 MG: 325 TABLET ORAL at 01:05

## 2022-08-05 RX ADMIN — MELATONIN 3 MG: at 21:58

## 2022-08-05 RX ADMIN — HYDROXYZINE HYDROCHLORIDE 50 MG: 25 TABLET, FILM COATED ORAL at 00:20

## 2022-08-05 RX ADMIN — MELATONIN 3 MG: at 00:20

## 2022-08-05 RX ADMIN — HYDROXYZINE HYDROCHLORIDE 50 MG: 25 TABLET, FILM COATED ORAL at 11:32

## 2022-08-05 RX ADMIN — OLANZAPINE 10 MG: 10 TABLET ORAL at 11:08

## 2022-08-05 RX ADMIN — MULTIPLE VITAMINS W/ MINERALS TAB 1 TABLET: TAB at 08:20

## 2022-08-05 RX ADMIN — Medication 1 PATCH: at 12:42

## 2022-08-05 RX ADMIN — ARIPIPRAZOLE 2 MG: 2 TABLET ORAL at 08:20

## 2022-08-05 RX ADMIN — NICOTINE 1 PATCH: 21 PATCH, EXTENDED RELEASE TRANSDERMAL at 08:22

## 2022-08-05 ASSESSMENT — ACTIVITIES OF DAILY LIVING (ADL)
ADLS_ACUITY_SCORE: 28
ADLS_ACUITY_SCORE: 28
DRESS: INDEPENDENT;SCRUBS (BEHAVIORAL HEALTH)
ADLS_ACUITY_SCORE: 28
ORAL_HYGIENE: INDEPENDENT
DRESS: SCRUBS (BEHAVIORAL HEALTH)
HYGIENE/GROOMING: INDEPENDENT
LAUNDRY: UNABLE TO COMPLETE
HYGIENE/GROOMING: INDEPENDENT
ADLS_ACUITY_SCORE: 28
ORAL_HYGIENE: INDEPENDENT
LAUNDRY: UNABLE TO COMPLETE
ADLS_ACUITY_SCORE: 28
ADLS_ACUITY_SCORE: 28

## 2022-08-05 NOTE — PROGRESS NOTES
"    The patient requested to speak to the SW. Sw spoke to the patient. The patient stated he was becoming more and more paranoid. The patient explained that he does not feel he can trust anyone on the unit including staff and other patient's. The patient kept pointing to a Travis Russell book and mumbled some things about the LIANG. The patient finally told the SW , \" These people I do not fucking trust.\" As he was pointing to LIANG in the book.       SW updated nursing that the patient was becoming more and more paranoid.   "

## 2022-08-05 NOTE — PLAN OF CARE
"PERI SARAVIA RN  8/5/2022  7:42 AM  Face to face shift report received from TIAN Waldron. Rounding completed, pt observed.     1430-Pt in bed sleeping at present.  1320-Pt reports feeling less anxious and paranoid after meds.  Attended group for about 30 minutes- went back to room.  1245-Lidocaine patch placed on left shoulder for pain.  1136-Pt very paranoid.  Paranoia was triggered by another pt who happens to be of  decent.  \"I hate the chinese and I feel like she is watching me and reporting to the chinese\".  Reassured pt that no one is watching/reporting  him and he is in a safe place.  Educated pt to inform staff immediately if he is unable to control behavior.  Pt agreed to avoid that pt.  Pt given zyprexa @ 1108.  Writer talked with pt for over 30 minutes when JACOB Weller came into assess pt.  Hydroxyzine given @1132 for anxiety.  Will continue to monitor pt.        0832-Reports sleeping well after hydroxyzine and melatonin given @0020.  Continues to complain of left shoulder pain- sensitive to touch and sore.  Declines interventions at this time.  Pt reports no noted injury.  Pt would like this addressed.  Pt very hopeful.  Reports his anxiety and depression has felt better since meds started.  Paranoia is decreasing.  Denies SI, HI, and hallucinations.  Ate breakfast in room but currently in lounge.         Problem: Behavioral Health Plan of Care  Goal: Patient-Specific Goal (Individualization)  Description: Pt will be free of delusional thinking at discharge.  Pt will attend group.  Pt will eat at least 50% of meals.  Pt will take medications as prescribed.  Pt will follow treatment tream recommendations.  Outcome: Ongoing, Progressing     Problem: Thought Process Alteration  Goal: Optimal Thought Clarity  Outcome: Ongoing, Progressing   Goal Outcome Evaluation:    Plan of Care Reviewed With: patient      Face to face end of shift report communicated to oncoming shift RN.                   "

## 2022-08-05 NOTE — PROGRESS NOTES
"[facility] PSYCHIATRY  PROGRESS NOTE     SUBJECTIVE     Patient is in his talking with his nurse.  They inform that he was in a fair mood this morning and doing well.  He reports after breakfast, he saw a patient who was from  [place] and this triggered some emotion of \"hating [place]\".  Patient agreed to PRN medication and we spend time with him to share his thoughts and concerns.  He presents as very labile, talks about his distrust in people, about his time in the , and he begins to sob in talking about a  friend's suicide last April and how he did not answer his phone when his friend called his suicide.  Patient admits to keeping \"a lot of stuff\" to himself.  His discussions are mainly about the , LIANG or FBI and debriefs.  He denies suicidal or homicidal thoughts.  We discuss his plan if he encounters someone he does not like here at the hospital = patient or staff.  He reports he will \"walk away\" from confrontation and agrees if this becomes to much to let nursing know right away.  He agrees to increase Abilify and take prns for agitation. anxiety, or sleep.        MEDICATIONS   Scheduled Meds:    ARIPiprazole  2 mg Oral Daily     multivitamin w/minerals  1 tablet Oral Daily     nicotine  1 patch Transdermal Daily     nicotine   Transdermal Q8H     PRN Meds:.acetaminophen, alum & mag hydroxide-simethicone, hydrOXYzine, melatonin, nicotine, OLANZapine **OR** OLANZapine, propranolol     ALLERGIES   Allergies   Allergen Reactions     Penicillins Unknown     Shellfish-Derived Products         MENTAL STATUS EXAM   Vitals: /92 (BP Location: Right arm)   Pulse 75   Temp 96.8  F (36  C) (Temporal)   Resp 16   Ht 1.854 m (6' 1\")   Wt 94.3 kg (207 lb 14.4 oz)   SpO2 99%   BMI 27.43 kg/m      Appearance:  awake, alert  Attitude:  cooperative  Eye Contact:  intense  Mood:  anxious and elevated today  Affect:  intensity is heightened  Speech:  pressured speech  Psychomotor " "Behavior:  no evidence of tardive dyskinesia, dystonia, or tics  Thought Process:  tangental  Associations:  loosening of associations present  Thought Content:  no evidence of suicidal ideation or homicidal ideation and delusion  Insight:  fair  Judgment:  fair  Oriented to:  time, person, and place  Attention Span and Concentration:  intact  Recent and Remote Memory:  intact  Language: Able to name objects, Able to repeat phrases and Able to read and write  Fund of Knowledge: appropriate  Muscle Strength and Tone: normal  Gait and Station: Normal       LABS   Recent Results (from the past 24 hour(s))   Extra Red Top Tube    Collection Time: 08/04/22  2:23 PM   Result Value Ref Range    Hold Specimen JIC    Extra Purple Top Tube    Collection Time: 08/04/22  2:23 PM   Result Value Ref Range    Hold Specimen JIC    Extra Green Top (Lithium Heparin) Tube    Collection Time: 08/04/22  2:24 PM   Result Value Ref Range    Hold Specimen JIC          IMPRESSION     Anton Donovan is a 31 year old male who is [facility]  Patient has a history of posttraumatic stress disorder and paranoia.  Apparently his been off his medications for several months.  He [facility] and it was recommended that he be brought here for admission and stabilization.  It is recommended that he get back on his medications.  Presently the patient is calm and cooperative.  He is conversant.  He denies suicidal or homicidal ideation.  He does however relate that he has been \"debriefed\" by the [facility].       DIAGNOSES     1.  Bipolar Affective Disorder, most recent manic  2.  PTSD, combat        PLAN     Location: Unit 5  Legal Status: Orders Placed This Encounter      Voluntary    Safety Assessment:    Behavioral Orders   Procedures     Code 1 - Restrict to Unit     Routine Programming     As clinically indicated     Status 15     Every 15 minutes.      PTA " medications continued/changed:     -discontinued risperidone by patient request  -Continue propranolol 10mg prn    New medications tried and stopped:     -None    New medications initiated:     - Abilify re-started    Today's Changes:    - Increase Abilify to 5mg daily - continue to titrate as tolerates  - Adding Seroquel Prn    Programming: Patient will be treated in a therapeutic milieu with appropriate individual and group therapies. Education will be provided on diagnoses, medications, and treatments.     Medical diagnoses:  Per medicine    Consult: None  Tests: None    Anticipated LOS: +5 days  Disposition: Home with services       TREATMENT TEAM CARE PLAN     Progress: Continued symptoms.    Continued Stay Criteria/Rationale: Continued symptoms without sufficient improvement/resolution.    Medical/Physical: See above.    Precautions: See above.     Plan: Continue inpatient care with unit support and medication management.    Rationale for change in precautions or plan: NA due to no change.    Participants: ROSANNA Ordaz CNP, Nursing, SW, OT.    The patient's care was discussed with the treatment team and chart notes were reviewed.       ATTESTATION      ROSANNA Ordaz CNP

## 2022-08-05 NOTE — PLAN OF CARE
Problem: Behavioral Health Plan of Care  Goal: Patient-Specific Goal (Individualization)  Description: Pt will be free of delusional thinking at discharge.  Pt will attend group.  Pt will eat at least 50% of meals.  Pt will take medications as prescribed.  Pt will follow treatment tream recommendations.  Outcome: Ongoing, Progressing  Note: Report received from Bennett. Bailey complete.     0020- atarax 50, melatonin 3 requested and admin per pt request for sleep and anxiety.  0105- Pt requested and was admin 650 mg tylenol for c/o left shoulder pain. He stated that he had been working out with weights before coming in and feels he may have injured something and would like to speak to his provider about having this assessed.    0215- Pt observed sleeping in supine position with regular and unlabored respirations.    Pt has been in bed with eyes closed and regular respirations. 15 minute and PRN checks all night. No complaints offered. Will continue to monitor.    Pt slept approx  4  hours this NOC shift.    Face to face end of shift report communicated to oncoming RN.    Vanita DEWITT RN  August 5, 2022  4:26 AM          Problem: Thought Process Alteration  Goal: Optimal Thought Clarity  Outcome: Ongoing, Progressing  Note: Pt heard to be talking to himself in his room. Pt denied responding to internal stimuli but does appear preoccupied. He is pleasant and cooperative although still somewhat guarded.   Goal Outcome Evaluation:

## 2022-08-05 NOTE — PLAN OF CARE
"  Problem: Behavioral Health Plan of Care  Goal: Patient-Specific Goal (Individualization)  Description: Pt will be free of delusional thinking at discharge.  Pt will attend group.  Pt will eat at least 50% of meals.  Pt will take medications as prescribed.  Pt will follow treatment tream recommendations.    8/5/22-Bed wedge used for sleep apnea.  Pt is able to have a tennis ball for stress.     Patient has been out on the open unit, watching tv, socializing with his peers, and attending groups. Affect is flat, mood is anxious. He admits to \"being stressed\", states he has a lot of thoughts in his head. States he hasn't had a cigarette in 3 days, and hopes he can continue to stay smoke free when he discharges, states \"my dad has been wanting to quit, maybe he will do it with me\". States he feels safe while on the unit, but \"that one girl is acting weird around me\", states she opened his room door when he was in his room talking to his provider, he was reassured that we will watch that his peers do not enter his room. Has been appropriate with staff and peers.   Patient removed his Nicotine patch, states he has been having \"weird\" dreams, he wants to see if the dreams stop without his patch. He showered before bed.  2158-requested and accepted Melatonin 3 mg for sleep.   Face to face end of shift report communicated to oncoming RN.     Adeline Badillo RN  8/5/2022  10:02 PM    Outcome: Ongoing, Progressing     Problem: Thought Process Alteration  Goal: Optimal Thought Clarity    Patient is able to make his needs known.   Outcome: Ongoing, Progressing   Goal Outcome Evaluation:    Plan of Care Reviewed With: patient                 "

## 2022-08-05 NOTE — PROGRESS NOTES
[facility]    Medical Services Progress Note    Date of Service (when I saw the patient): 08/05/2022    Assessment & Plan     Principal Problem:    PTSD (post-traumatic stress disorder)     Active Medical Problems:    Paranoia (H)     Pilonidal cyst- pt was medically cleared on 7/26 for surgery for 8/1. It is noted that he was a no show for the procedure. He states they never called him. He will need to follow up to get a new date scheduled for the surgery. Pt verbalized understanding.      Tick bite- pt report he pulled a tick off the center of his back 2 weeks ago. [facility] not [facility] any [facility] and did not have any lab work completed. Round erythematous area around tick bite. Reports minimal itching. Denies any other rashes. Reports some left shoulder pain but reports this is due to working out and was occurring prior to the tick bite. Will order tick panel. Discussed with hospitalist BEE Coley and she recommended just doing the tick panel at this time. We are past the point for Prophylactic doxycyline.   8/5/22- tick bite area on center of back not as red today. No drainage noted. Denies rash, joint pain. Lyme, babesia, and ehrlichia pending.     Left shoulder pain- pt reported yesterday some left shoulder tenderness. He reports this started after increasing his workouts a few weeks ago. He denies any known injury. He has full range of motion without difficulty. He states it is tender to touch. No obvious abnormality. He would like to try a lidoderm patch. Ibuprofen ordered prn. Continue alternating tylenol and ibuprofen prn for pain. If Lidoderm patch does not work for him can try voltaren gel. Nursing to monitor response and consult if needed.     Code Status: Full Code.    Devora Oneil CNP      -Data reviewed today: I reviewed all new labs and imaging results over the last 24 hours.     Physical Exam   Temp: 96.8  F (36  C) Temp src: Temporal BP:  153/92 Pulse: 75   Resp: 16 SpO2: 99 % O2 Device: Face tent    Vitals:    08/03/22 1725   Weight: 94.3 kg (207 lb 14.4 oz)     Vital Signs with Ranges  Temp:  [96.8  F (36  C)-97.8  F (36.6  C)] 96.8  F (36  C)  Pulse:  [74-75] 75  Resp:  [16-18] 16  BP: (148-153)/(88-92) 153/92  SpO2:  [98 %-99 %] 99 %  No intake/output data recorded.    Constitutional: awake, alert, cooperative, no apparent distress, and appears stated age, vitals stable  Respiratory: No increased work of breathing, good air exchange, clear to auscultation bilaterally, no crackles or wheezing  Cardiovascular: Normal apical impulse, regular rate and rhythm, normal S1 and S2, no S3 or S4, and no murmur noted  Musculoskeletal: There is no redness, warmth, or swelling of the joints.  Full range of motion noted.  Motor strength is 5 out of 5 all extremities bilaterally.  Tone is normal.  Neuropsychiatric: General: anxious, paranoid, fidgeting and normal eye contact    Medications     ARIPiprazole  2 mg Oral Daily     lidocaine  1 patch Transdermal Q24H     lidocaine   Transdermal Q8H NEGRA     multivitamin w/minerals  1 tablet Oral Daily     nicotine  1 patch Transdermal Daily     nicotine   Transdermal Q8H       Data   No lab results found in last 7 days.    No results found for this or any previous visit (from the past 24 hour(s)).

## 2022-08-06 PROCEDURE — 250N000013 HC RX MED GY IP 250 OP 250 PS 637: Performed by: NURSE PRACTITIONER

## 2022-08-06 PROCEDURE — 99232 SBSQ HOSP IP/OBS MODERATE 35: CPT | Performed by: NURSE PRACTITIONER

## 2022-08-06 PROCEDURE — 124N000001 HC R&B MH

## 2022-08-06 RX ORDER — PRAZOSIN HYDROCHLORIDE 1 MG/1
1 CAPSULE ORAL AT BEDTIME
Status: DISCONTINUED | OUTPATIENT
Start: 2022-08-06 | End: 2022-08-08 | Stop reason: HOSPADM

## 2022-08-06 RX ORDER — QUETIAPINE FUMARATE 50 MG/1
50 TABLET, FILM COATED ORAL 2 TIMES DAILY PRN
Status: DISCONTINUED | OUTPATIENT
Start: 2022-08-06 | End: 2022-08-08 | Stop reason: HOSPADM

## 2022-08-06 RX ADMIN — Medication 1 PATCH: at 16:06

## 2022-08-06 RX ADMIN — MELATONIN 3 MG: at 20:05

## 2022-08-06 RX ADMIN — MULTIPLE VITAMINS W/ MINERALS TAB 1 TABLET: TAB at 08:13

## 2022-08-06 RX ADMIN — ARIPIPRAZOLE 5 MG: 5 TABLET ORAL at 08:13

## 2022-08-06 RX ADMIN — NICOTINE POLACRILEX 2 MG: 2 GUM, CHEWING BUCCAL at 01:48

## 2022-08-06 RX ADMIN — ACETAMINOPHEN 650 MG: 325 TABLET ORAL at 06:53

## 2022-08-06 RX ADMIN — NICOTINE 1 PATCH: 21 PATCH, EXTENDED RELEASE TRANSDERMAL at 08:13

## 2022-08-06 RX ADMIN — PRAZOSIN HYDROCHLORIDE 1 MG: 1 CAPSULE ORAL at 20:05

## 2022-08-06 RX ADMIN — PROPRANOLOL HYDROCHLORIDE 10 MG: 10 TABLET ORAL at 13:59

## 2022-08-06 ASSESSMENT — ACTIVITIES OF DAILY LIVING (ADL)
ADLS_ACUITY_SCORE: 28
ORAL_HYGIENE: INDEPENDENT
LAUNDRY: UNABLE TO COMPLETE
HYGIENE/GROOMING: INDEPENDENT
ADLS_ACUITY_SCORE: 28
DRESS: SCRUBS (BEHAVIORAL HEALTH)
ADLS_ACUITY_SCORE: 28
ORAL_HYGIENE: INDEPENDENT
ADLS_ACUITY_SCORE: 28
DRESS: INDEPENDENT;SCRUBS (BEHAVIORAL HEALTH)
LAUNDRY: UNABLE TO COMPLETE
ADLS_ACUITY_SCORE: 28
HYGIENE/GROOMING: INDEPENDENT
ADLS_ACUITY_SCORE: 28

## 2022-08-06 NOTE — PLAN OF CARE
"  Problem: Behavioral Health Plan of Care  Goal: Patient-Specific Goal (Individualization)  Description: Pt will be free of delusional thinking at discharge.  Pt will attend group.  Pt will eat at least 50% of meals.  Pt will take medications as prescribed.  Pt will follow treatment tream recommendations.    8/5/22-Bed wedge used for sleep apnea.  Pt is able to have a tennis ball for stress.   Outcome: Ongoing, Progressing  Note: Report received from Ambreen OVERTON. Rounding complete.  Patient is awake and in the lounge at start of shift.    Patient is cooperative with medications and assessment.  Cooperative with increased dose of Abilify at 5 mg this morning.  He has been on Abilify in the past and reports getting monthly injections which he feels he was stable on.  He reports \"crashing\" and sleeping a lot when he first started oral Abilify in the past.  He stopped his medication r/t feeling like he is unable to talk openly to his outpatient provider as it is a virtual visit.  Patient talks about being \"manic\" and liking the feeling because he has more energy.  Education provided r/t the need for mood regulation.  Patient agrees with wanting to avoid the extreme manic and depressive episodes.   Patient verbalizes some left shoulder pain which he took PRN acetaminophen for prior to this shift.  He reports a decrease in pain and declines further pain intervention at this time.    Patient requested and received a visit from the  this morning.  1359: Patient requested and received PRN propranolol 10 mg po for c/o anxiety.     Problem: Thought Process Alteration  Goal: Optimal Thought Clarity  Description: Patient will report a decrease in paranoid thoughts by discharge.   Outcome: Ongoing, Not Progressing  Note: Patient did not make any paranoid or delusional statements this shift.   "

## 2022-08-06 NOTE — PLAN OF CARE
"  Problem: Behavioral Health Plan of Care  Goal: Patient-Specific Goal (Individualization)  Description: Pt will be free of delusional thinking at discharge.  Pt will attend group.  Pt will eat at least 50% of meals.  Pt will take medications as prescribed.  Pt will follow treatment tream recommendations.    8/5/22-Bed wedge used for sleep apnea.  Pt is able to have a tennis ball for stress.     Patient is withdrawn, spends time in the lounge watching tv, he doesn't engage in much conversation with his peers. Affect is flat, mood is calm. He admits to anxiety, states \"it's manageable\", states he thinks \"things are getting a bit better\". He requested his Lidoderm patch for his left shoulder, that was applied around 1600, he is aware he needs to have it removed around 0400. He took his Nicotine patch off after his shower as it was not staying on anymore. Has been appropriate with staff and peers.   2005-requested and accepted Melatonin 3 mg for sleep.   Face to face end of shift report communicated to oncoming RN.     Adeline Badillo RN  8/6/2022  10:27 PM    Outcome: Ongoing, Progressing     Problem: Thought Process Alteration  Goal: Optimal Thought Clarity  Description: Patient will report a decrease in paranoid thoughts by discharge.     Patient has not made any paranoid statements. He is able to make his needs known.   Outcome: Ongoing, Progressing   Goal Outcome Evaluation:    Plan of Care Reviewed With: patient                 "

## 2022-08-06 NOTE — PROGRESS NOTES
"[facility]  PROGRESS NOTE     SUBJECTIVE     Patient agrees to meet in his room.  He reports sleeping \"pretty good\" last night although does wake up from nightmares.  He has minipress at home he takes prn, recommend we scheduled this for in hospital he agrees.  He report mood feels \"more calm\" and he presents this way as well.  Patient denies suicidal or homicidal thoughts, no hallucination and presents as much less paranoid overall today.  He reports feeling like the Abilify is helping calm things down although does have transient anxiety and is still easily triggered.  Patient reports he plans to take Seroquel tonight for sleep, along with minipress is hoping for a solid night's sleep tonight.  PRNs available.     MEDICATIONS   Scheduled Meds:    ARIPiprazole  5 mg Oral Daily     lidocaine  1 patch Transdermal Q24H     lidocaine   Transdermal Q8H NEGRA     multivitamin w/minerals  1 tablet Oral Daily     nicotine  1 patch Transdermal Daily     nicotine   Transdermal Q8H     PRN Meds:.acetaminophen, alum & mag hydroxide-simethicone, hydrOXYzine, ibuprofen, melatonin, nicotine, OLANZapine **OR** OLANZapine, propranolol, QUEtiapine     ALLERGIES   Allergies   Allergen Reactions     Penicillins Unknown     Shellfish-Derived Products         MENTAL STATUS EXAM   Vitals: /96 (BP Location: Left arm)   Pulse 74   Temp 97.5  F (36.4  C) (Temporal)   Resp 16   Ht 1.854 m (6' 1\")   Wt 94.3 kg (207 lb 14.4 oz)   SpO2 99%   BMI 27.43 kg/m      Appearance:  awake, alert  Attitude:  cooperative  Eye Contact:  good - less intense  Mood:  anxious although less elevated today  Affect:  intensity is flat  Speech:  pressured speech - improving  Psychomotor Behavior:  no evidence of tardive dyskinesia, dystonia, or tics  Thought Process:  tangental  Associations:  loosening of associations present  Thought Content:  no evidence of suicidal ideation or homicidal ideation and no evidence of psychotic thought - " "no delusional statements  Insight:  fair  Judgment:  fair  Oriented to:  time, person, and place  Attention Span and Concentration:  intact  Recent and Remote Memory:  intact  Language: Able to name objects, Able to repeat phrases and Able to read and write  Fund of Knowledge: appropriate  Muscle Strength and Tone: normal  Gait and Station: Normal       LABS   No results found for this or any previous visit (from the past 24 hour(s)).      IMPRESSION     Anton Donovan is a 31 year old male who is [facility]  Patient has a history of posttraumatic stress disorder and paranoia.  Apparently his been off his medications for several months.  [facility] and it was recommended that he be brought here for admission and stabilization.  It is recommended that he get back on his medications.  Presently the patient is calm and cooperative.  He is conversant.  He denies suicidal or homicidal ideation.  He does however relate that he has been \"debriefed\" by the [facility].       DIAGNOSES     1.  Bipolar Affective Disorder, most recent manic  2.  PTSD, combat        PLAN     Location: Unit 5  Legal Status: Orders Placed This Encounter      Voluntary    Safety Assessment:    Behavioral Orders   Procedures     Code 1 - Restrict to Unit     Routine Programming     As clinically indicated     Status 15     Every 15 minutes.      PTA medications continued/changed:     -discontinued risperidone by patient request  -Continue propranolol 10mg prn    New medications tried and stopped:     -None    New medications initiated:     - Abilify re-started    Today's Changes:    - Continue Abilify 5mg daily - continue to titrate as tolerates  - Continue Seroquel and propranolol Prn  - Starting minipress 1mg    Programming: Patient will be treated in a therapeutic milieu with appropriate individual and group therapies. Education will be provided on " diagnoses, medications, and treatments.     Medical diagnoses:  Per medicine    Consult: None  Tests: None    Anticipated LOS: +5 days  Disposition: Home with services       TREATMENT TEAM CARE PLAN     Progress: Continued symptoms.    Continued Stay Criteria/Rationale: Continued symptoms without sufficient improvement/resolution.    Medical/Physical: See above.    Precautions: See above.     Plan: Continue inpatient care with unit support and medication management.    Rationale for change in precautions or plan: NA due to no change.    Participants: ROSANNA Ordaz CNP, Nursing, SW, OT.    The patient's care was discussed with the treatment team and chart notes were reviewed.       ATTESTATION      ROSANNA Ordaz CNP

## 2022-08-06 NOTE — PLAN OF CARE
Face to face end of shift report will be communicated to oncoming RN.    Problem: Behavioral Health Plan of Care  Goal: Patient-Specific Goal (Individualization)  Description: Pt will be free of delusional thinking at discharge.  Pt will attend group.  Pt will eat at least 50% of meals.  Pt will take medications as prescribed.  Pt will follow treatment tream recommendations.    8/5/22-Bed wedge used for sleep apnea.  Pt is able to have a tennis ball for stress.   Outcome: Ongoing, Progressing     Problem: Thought Process Alteration  Goal: Optimal Thought Clarity  Outcome: Ongoing, Progressing   Goal Outcome Evaluation:  Face to face end of shift report obtained from TIAN Lr. Pt observed resting in bed.   Pt appears to be sleeping in bed with eyes closed, having regular respirations, and position changes. 15 minutes and PRN safety checks completed with no noted complains. No delusional comments noted or reported so far this shift.   0230-Pt c/o unable to sleep and feeling restless. Offered PRN medications. Pt declined but accepted sound machine to try.   0600-Pt appeared to had slept only 1-1.5 hours.   0653-Pt c/o 5/10 headache. Acetaminophen 650 mg given per request.

## 2022-08-07 PROCEDURE — 250N000013 HC RX MED GY IP 250 OP 250 PS 637: Performed by: NURSE PRACTITIONER

## 2022-08-07 PROCEDURE — 124N000001 HC R&B MH

## 2022-08-07 RX ADMIN — MELATONIN 3 MG: at 20:13

## 2022-08-07 RX ADMIN — QUETIAPINE FUMARATE 50 MG: 50 TABLET ORAL at 20:12

## 2022-08-07 RX ADMIN — MULTIPLE VITAMINS W/ MINERALS TAB 1 TABLET: TAB at 09:03

## 2022-08-07 RX ADMIN — PRAZOSIN HYDROCHLORIDE 1 MG: 1 CAPSULE ORAL at 20:12

## 2022-08-07 RX ADMIN — ACETAMINOPHEN 650 MG: 325 TABLET ORAL at 15:51

## 2022-08-07 RX ADMIN — NICOTINE 1 PATCH: 21 PATCH, EXTENDED RELEASE TRANSDERMAL at 05:56

## 2022-08-07 RX ADMIN — ARIPIPRAZOLE 5 MG: 5 TABLET ORAL at 09:03

## 2022-08-07 ASSESSMENT — ACTIVITIES OF DAILY LIVING (ADL)
ORAL_HYGIENE: INDEPENDENT
ADLS_ACUITY_SCORE: 28
DRESS: SCRUBS (BEHAVIORAL HEALTH)
ADLS_ACUITY_SCORE: 28
LAUNDRY: UNABLE TO COMPLETE
DRESS: SCRUBS (BEHAVIORAL HEALTH);INDEPENDENT
ORAL_HYGIENE: INDEPENDENT
ADLS_ACUITY_SCORE: 28
LAUNDRY: UNABLE TO COMPLETE
HYGIENE/GROOMING: INDEPENDENT

## 2022-08-07 NOTE — PLAN OF CARE
"  Problem: Behavioral Health Plan of Care  Goal: Patient-Specific Goal (Individualization)  Description: Pt will be free of delusional thinking at discharge.  Pt will attend group.  Pt will eat at least 50% of meals.  Pt will take medications as prescribed.  Pt will follow treatment tream recommendations.    8/5/22-Bed wedge used for sleep apnea.  Pt is able to have a tennis ball for stress.     Patient has been out on the open unit, watching tv and attending groups. Affect is flat, mood is calm. He admits to anxiety, and pain in his shoulder, mid upper back, and his feet. He accepted Tylenol 650 mg at 1551. States \"my feet really hurt from these floors, I got my shoes so I hope that helps. I've been doing push ups and trying to work out. I also lost around 80 pounds, my body is changing, and I'm trying to get back into shape\". He was encouraged to attend OT group tomorrow to discuss exercises with staff. He declines his Lidoderm patch application, states \"I don't need it, it doesn't help all that much anyway\". Has been appropriate with staff and peers.   2013-requested and accepted Melatonin 3 mg and Seroquel 50 mg for sleep. States \"the last time I had Seroquel, I woke in the night to use the bathroom, and went to wipe my butt, and almost fell over. But I really want to take this because I want some sleep tonight\". He was encouraged to move slowly when changing positions. He talks of \"being embarrassed of how I acted before I came in here. I went to my friends, and they said 'man, you need help', and I was like 'fuck you'. I just wanted someone to talk to, now I feel bad for what I said to them\". States he has been writing his feelings in his journal, and does plan to apologize to his friends for his behaviors.   Face to face end of shift report communicated to onckatina OVERTON.     Adeline Badillo RN  8/7/2022  9:43 PM    Outcome: Ongoing, Progressing     Problem: Thought Process Alteration  Goal: Optimal " Thought Clarity  Description: Patient will report a decrease in paranoid thoughts by discharge.     Patient is able to make his needs known, has not made any paranoid statements.   Outcome: Ongoing, Progressing   Goal Outcome Evaluation:    Plan of Care Reviewed With: patient

## 2022-08-07 NOTE — PLAN OF CARE
Face to face end of shift report will be communicated to oncoming RN.    Problem: Behavioral Health Plan of Care  Goal: Patient-Specific Goal (Individualization)  Description: Pt will be free of delusional thinking at discharge.  Pt will attend group.  Pt will eat at least 50% of meals.  Pt will take medications as prescribed.  Pt will follow treatment tream recommendations.    8/5/22-Bed wedge used for sleep apnea.  Pt is able to have a tennis ball for stress.   Outcome: Ongoing, Progressing     Problem: Thought Process Alteration  Goal: Optimal Thought Clarity  Description: Patient will report a decrease in paranoid thoughts by discharge.   Outcome: Ongoing, Progressing   Goal Outcome Evaluation:  Face to face end of shift report obtained from TIAN Lr. Pt observed resting in bed.  0000-Pt woke up returned Lidoderm patch and went back to bed. States he still having weird dreams and that he is being waking up at midnight almost everyday. Pt declined any PRN or snacks at this time.     0600-Pt spend most of the night awake or restless. Appeared to had slept 2 hours, maybe slightly more but difficult to determine. Pt did request juice and headphones during the night. Pt also requested nicotine patch this morning. Time for scheduled nicotine patch changed to 0600 per patient's preference.

## 2022-08-07 NOTE — PLAN OF CARE
"  Problem: Behavioral Health Plan of Care  Goal: Patient-Specific Goal (Individualization)  Description: Pt will be free of delusional thinking at discharge.  Pt will attend group.  Pt will eat at least 50% of meals.  Pt will take medications as prescribed.  Pt will follow treatment tream recommendations.    8/5/22-Bed wedge used for sleep apnea.  Pt is able to have a tennis ball for stress.   Outcome: Ongoing, Progressing  Note: Report received from Ambreen OVERTON.  Rounding complete.  Patient observed awake and in the lounge at start of shift.    Patient denies SI, HI, hallucinations.  He feels like his mood is not as elevated or \"manic\" and abilify is helping.  Patient's mood is less labile today.  He is wooried about paying his bills.  Patient agrees to wait for treatment team decision on if he can pay his bills from his phone.  His friend currently has his phone and could bring it to the hospital is needed.  Patient is attending all available groups this shift.       Problem: Thought Process Alteration  Goal: Optimal Thought Clarity  Description: Patient will report a decrease in paranoid thoughts by discharge.   Outcome: Ongoing, Progressing  Note: Patient did not make paranoid statements this shift.    Goal Outcome Evaluation:    Face to face end of shift report communicated to Adeline OVERTON.     Eliza Chavez RN  8/7/2022  3:03 PM          "

## 2022-08-07 NOTE — PROGRESS NOTES
"ANTON MASON requested visit for short time today. Anton was alert and orientated.  Stated \"I went off my meds a few months ago\" and became more manic.  We discussed this.  He is anticipating a discharge soon but recognizes that he must stay on his medications.  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     "

## 2022-08-08 ENCOUNTER — APPOINTMENT (OUTPATIENT)
Dept: OCCUPATIONAL THERAPY | Facility: HOSPITAL | Age: 32
DRG: 885 | End: 2022-08-08
Payer: COMMERCIAL

## 2022-08-08 VITALS
TEMPERATURE: 97.8 F | SYSTOLIC BLOOD PRESSURE: 134 MMHG | DIASTOLIC BLOOD PRESSURE: 82 MMHG | HEIGHT: 73 IN | WEIGHT: 207.9 LBS | HEART RATE: 80 BPM | RESPIRATION RATE: 18 BRPM | BODY MASS INDEX: 27.55 KG/M2 | OXYGEN SATURATION: 98 %

## 2022-08-08 LAB
A PHAGOCYTOPH DNA BLD QL NAA+PROBE: NOT DETECTED
B MICROTI DNA BLD QL NAA+PROBE: NOT DETECTED
BABESIA DNA BLD QL NAA+PROBE: NOT DETECTED
E CHAFFEENSIS DNA BLD QL NAA+PROBE: NOT DETECTED
E EWINGII DNA SPEC QL NAA+PROBE: NOT DETECTED
EHRLICHIA DNA SPEC QL NAA+PROBE: NOT DETECTED

## 2022-08-08 PROCEDURE — 250N000013 HC RX MED GY IP 250 OP 250 PS 637: Performed by: NURSE PRACTITIONER

## 2022-08-08 PROCEDURE — 99239 HOSP IP/OBS DSCHRG MGMT >30: CPT | Performed by: NURSE PRACTITIONER

## 2022-08-08 PROCEDURE — 97165 OT EVAL LOW COMPLEX 30 MIN: CPT | Mod: GO

## 2022-08-08 RX ORDER — ARIPIPRAZOLE 5 MG/1
5 TABLET ORAL DAILY
Qty: 30 TABLET | Refills: 0 | Status: CANCELLED | OUTPATIENT
Start: 2022-08-09

## 2022-08-08 RX ORDER — LANOLIN ALCOHOL/MO/W.PET/CERES
3 CREAM (GRAM) TOPICAL
COMMUNITY
Start: 2022-08-08 | End: 2023-08-17

## 2022-08-08 RX ORDER — PRAZOSIN HYDROCHLORIDE 1 MG/1
1 CAPSULE ORAL AT BEDTIME
Qty: 30 CAPSULE | Refills: 0 | Status: SHIPPED | OUTPATIENT
Start: 2022-08-08 | End: 2023-08-17

## 2022-08-08 RX ORDER — ARIPIPRAZOLE 5 MG/1
5 TABLET ORAL DAILY
Qty: 30 TABLET | Refills: 0 | Status: SHIPPED | OUTPATIENT
Start: 2022-08-09 | End: 2023-08-17

## 2022-08-08 RX ORDER — PRAZOSIN HYDROCHLORIDE 1 MG/1
1 CAPSULE ORAL AT BEDTIME
Qty: 30 CAPSULE | Refills: 0 | Status: CANCELLED | OUTPATIENT
Start: 2022-08-08

## 2022-08-08 RX ORDER — QUETIAPINE FUMARATE 50 MG/1
50 TABLET, FILM COATED ORAL 2 TIMES DAILY PRN
Qty: 30 TABLET | Refills: 0 | Status: SHIPPED | OUTPATIENT
Start: 2022-08-08 | End: 2023-08-17

## 2022-08-08 RX ORDER — QUETIAPINE FUMARATE 50 MG/1
50 TABLET, FILM COATED ORAL
Qty: 30 TABLET | Refills: 0 | Status: CANCELLED | OUTPATIENT
Start: 2022-08-08

## 2022-08-08 RX ORDER — LANOLIN ALCOHOL/MO/W.PET/CERES
3 CREAM (GRAM) TOPICAL
Status: CANCELLED | COMMUNITY
Start: 2022-08-08

## 2022-08-08 RX ADMIN — NICOTINE 1 PATCH: 21 PATCH, EXTENDED RELEASE TRANSDERMAL at 05:38

## 2022-08-08 RX ADMIN — MULTIPLE VITAMINS W/ MINERALS TAB 1 TABLET: TAB at 08:38

## 2022-08-08 RX ADMIN — ARIPIPRAZOLE 5 MG: 5 TABLET ORAL at 08:38

## 2022-08-08 RX ADMIN — MAGNESIUM HYDROXIDE 30 ML: 400 SUSPENSION ORAL at 12:54

## 2022-08-08 RX ADMIN — ACETAMINOPHEN 650 MG: 325 TABLET ORAL at 14:59

## 2022-08-08 ASSESSMENT — ACTIVITIES OF DAILY LIVING (ADL)
ADLS_ACUITY_SCORE: 28
DRESS: INDEPENDENT;SCRUBS (BEHAVIORAL HEALTH)
ADLS_ACUITY_SCORE: 28
HYGIENE/GROOMING: INDEPENDENT
ADLS_ACUITY_SCORE: 28
ORAL_HYGIENE: INDEPENDENT
LAUNDRY: UNABLE TO COMPLETE

## 2022-08-08 NOTE — DISCHARGE SUMMARY
"[facility] PSYCHIATRY  DISCHARGE SUMMARY     DISCHARGE DATA     Anton Donovan MRN# [id number]   Age: 31 year old YOB: 1990     Date of Admission: 8/3/2022  Date of Discharge: August 8, 2022  Discharge Provider: ROSANNA Ordaz CNP       REASON FOR ADMISSION     Anton Donovan is a 31 year old male who is [facility] [place] [facility]  Patient has a history of posttraumatic stress disorder and paranoia.  Apparently his been off his medications for several months.  He [facility] and it was recommended that he be brought here for admission and stabilization.  It is recommended that he get back on his medications.  Presently the patient is calm and cooperative.  He is conversant.  He denies suicidal or homicidal ideation.  He does however relate that he has been \"[facility]\" [facility].       DISCHARGE DIAGNOSES     1. Bipolar Affective Disorder, most recent episode manic  2. PTSD, combat        CONSULTS     OT - see notes       HOSPITAL COURSE     Legal status: Orders Placed This Encounter      Voluntary    Patient was admitted to unit 5 due to the aforementioned presentation. The patient was placed under 15 minute checks to ensure patient safety. The patient participated in unit programming and groups as able.  We re-started Abilify at low dose, increased to only 5mg as patient does not want to increase further at this point.  His plan is to get back on RICHARDSON with Dr. Fuentes which is her recommendation as well.  Patient did get good sleep last night taking 50mg Seroquel, 30 day supply of both medications will be escribed into local pharmacy.  He reports feeling much improved mood, presents more calm past couple days and he denies suicidal or homicidal ideation.  There is no evidence of hallucination and patient is able to have linear and logical discussion, proves insightful although may be residual or fixed " delusion, mistrust and some paranoia - which is not alarming given PTSD and di.    The following changes to the patient's psychiatric medications were made:    PTA medications held:     - None    PTA medications continued/changed:     - Continue propranolol prn for anxiety  - Continue Seroquel 50mg prn for sleep/restlessness    New medications tried and stopped:     - None    New medications initiated:     - Abilify titrated to 5mg - patient wants to transition to RICHARDSON    With these changes and supports the patient noticed improvement in their symptoms and felt sufficiently ready for discharge. As a result, Anton Donovan was discharged. At the time of discharge, Anton Donovan was determined to not be a danger to self or others. The patient was also medically stable for discharge. At the current time of discharge, the patient does not meet criteria for involuntary hospitalization. On the day of discharge, the patient reports that they do not have suicidal or homicidal ideation. Steps taken to minimize risk include: assessing patient s behavior and thought process daily during hospital stay, discharging patient with adequate plan for follow up for mental and physical health and discussing safety plan of returning to the hospital should the patient ever have thoughts of harming themselves or others. Therefore, based on all available evidence including the factors cited above, the patient does not appear to be at imminent risk for self-harm, and is appropriate for outpatient level of care. However, if patient uses substances or is medication non-adherent, their risk of decompensation and SI will be elevated. This was discussed with the patient.       DISCHARGE MEDICATIONS     Current Discharge Medication List      CONTINUE these medications which have NOT CHANGED    Details   multivitamin, therapeutic (THERA-VIT) TABS tablet Take 1 tablet by mouth daily      mupirocin (BACTROBAN) 2 % external ointment Apply  "topically 3 times daily  Qty: 30 g, Refills: 0    Associated Diagnoses: Tick bite of back, initial encounter      nicotine (NICODERM CQ) 21 MG/24HR 24 hr patch Place 1 patch onto the skin every 24 hours      propranolol (INDERAL) 20 MG tablet Take 20 mg by mouth 3 times daily           Reason for two or more neuroleptics: Abilify for mood stability and Seroquel PRN helps with sleep/restlessness        MENTAL STATUS EXAM   Vitals: /82   Pulse 80   Temp 97.8  F (36.6  C) (Temporal)   Resp 18   Ht 1.854 m (6' 1\")   Wt 94.3 kg (207 lb 14.4 oz)   SpO2 98%   BMI 27.43 kg/m      Appearance: Alert, oriented, dressed in hospital scrubs, appears stated age   Attitude: Cooperative   Eye Contact: Good  Mood: \"Better\"  Affect: Full range of affect  Speech: Normal rate and rhythm   Psychomotor Behavior: No tremor, rigidity, or psychomotor abnormality   Thought Process: Logical, goal directed   Associations: No loose associations   Thought Content: Denies SI or plan. No SIB. Denies A/V hallucinations. No evidence of delusional thought.  Insight: Good  Judgment: Good  Oriented to: Person, place, and time  Attention Span and Concentration: Intact  Recent and Remote Memory: Intact  Language: English with appropriate syntax and vocabulary  Fund of Knowledge: Average  Muscle Strength and Tone: Grossly normal  Gait and Station: Grossly normal       TREATMENT TEAM CARE PLAN     Progress: Symptoms improved.    Continued Stay Criteria/Rationale: Ongoing treatment and safe discharge planning.    Medical/Physical: See above.    Precautions: See above.     Plan: Discharge to home    Rationale for change in precautions or plan: NA due to no change.    Participants: ROSANNA Ordaz CNP, Nursing, SW, OT.    The patient's care was discussed with the treatment team and chart notes were reviewed.         DISCHARGE PLAN     1.  Education given regarding diagnostic and treatment options with risks, benefits and alternatives " with adequate verbalization of understanding.  2.  Discharge to Home. Upon detailed review of risk factors, patient amenable for release.   3.  Continue aforementioned medications and associated medication changes with follow-up by outpatient provider.  4.  Crisis management planning in place.    5.  Nursing and  to review further discharge recommendations.   6.  Patient is being discharged with the following appointments as detailed below.    Health Care Follow-up:      [place] [id number], [address]  [place] [address]  Appointment: 08/18/2022 @ 10:00 am for Medication Management - With Dr. Freire.   Directions  Phone numbers  Main phone: [phone]  Mental health care: [phone]  Clinical hours  Mon: 7:30 a.m. to 4:30 p.m.  Tue: 7:30 a.m. to 4:30 p.m.  Wed: 7:30 a.m. to 4:30 p.m.  Thu: 7:30 a.m. to 4:30 p.m.  Fri: 7:30 a.m. to 4:30 p.m.  Sat: Closed  Sun: Closed     [facility]  [address] [place] [address] [phone]  [email or web address]     Maximilian at hospitals can be contacted.  He does know patient and is willing to help. [phone]       They will be contacting the [facility] at discharge to review eligible services available to him and what he may qualify for.   [facility]  Physical Address View Map  [address] [address]  Phone: [phone]  Fax: [phone]  Hours  Monday - Friday  8 a.m. - 4:30 p.m.  (closed noon - 1 p.m.)        [facility]  [email or web address]  [address] [place] [address] [phone]     Meeting Location & Time     [address] [place] [id number]  [facility]     6:00 PM 2nd Tuesday     [facility]  [address] [facility] [facility] [address]  [place] [address]  [phone]) and [phone], or by email at [email or web address].   Hours of operation are Monday - Friday, 9 am - 9 pm [facility]  Clinics and Medical Centers  [facility] [phone]  [facility] [phone]   [facility]  [phone]  [facility] [phone]  [facility] [phone]  [facility] [phone]  [facility] [phone]        [facility] Crisis Response Team  Services   Address: [address] [place] [address]  Phone: [phone]   Fax: [phone]  Email: [email or web address]       DISCHARGE SERVICES PROVIDED     40 minutes spent on discharge services, including:  Final examination of patient.  Review and discussion of hospital stay.  Instructions for continued outpatient care/goals.  Preparation of discharge records.  Preparation of medications refills and new prescriptions.  Preparation of applicable referral forms.        ATTESTATION     ROSANNA Ordaz CNP       LABS THIS ADMISSION     Results for orders placed or performed during the hospital encounter of 08/03/22   Asymptomatic COVID-19 Virus (Coronavirus) by PCR Nasopharyngeal     Status: Normal    Specimen: Nasopharyngeal; Swab   Result Value Ref [facility]    SARS CoV2 PCR Negative Negative    Narrative    Testing was performed using the Xpert Xpress SARS-CoV-2 Assay on the   Cepheid Gene-Xpert Instrument Systems. Additional information about   this Emergency Use Authorization (EUA) assay can be found via the Lab   Guide. This test should be ordered for the detection of SARS-CoV-2 in   individuals who meet SARS-CoV-2 clinical and/or epidemiological   criteria. Test performance is unknown in asymptomatic patients. This   test is for in vitro diagnostic use under the FDA EUA for   laboratories certified under CLIA to perform high complexity testing.   This test has not been FDA cleared or approved. A negative result   does not rule out the presence of PCR inhibitors in the specimen or   target RNA in  concentration below the limit of detection for the   assay. The possibility of a false negative should be considered if   the patient's recent exposure or clinical presentation suggests   COVID-19. This test was validated by [facility]. This laboratory is certified under the Clinical Laboratory Improve  ment Amendments (CLIA) as qualified to perform high complexity testing.   Urine Drugs of Abuse Screen Panel 13     Status: Abnormal   Result Value Ref [facility]    Cannabinoids (95-hfj-1-carboxy-9-THC) Detected (A) Not Detected, Indeterminate    Phencyclidine Not Detected Not Detected, Indeterminate    Cocaine (Benzoylecgonine) Not Detected Not Detected, Indeterminate    Methamphetamine (d-Methamphetamine) Not Detected Not Detected, Indeterminate    Opiates (Morphine) Not Detected Not Detected, Indeterminate    Amphetamine (d-Amphetamine) Not Detected Not Detected, Indeterminate    Benzodiazepines (Nordiazepam) Not Detected Not Detected, Indeterminate    Tricyclic Antidepressants (Desipramine) Not Detected Not Detected, Indeterminate    Methadone Not Detected Not Detected, Indeterminate    Barbiturates (Butalbital) Not Detected Not Detected, Indeterminate    Oxycodone Not Detected Not Detected, Indeterminate    Propoxyphene (Norpropoxyphene) Not Detected Not Detected, Indeterminate    Buprenorphine Not Detected Not Detected, Indeterminate   Extra Tube     Status: None    Narrative    The following orders were created for panel order Extra Tube.  Procedure                               Abnormality         Status                     ---------                               -----------         ------                     Extra Red Top Tube[[id number]]                               Final result               Extra Green Top (Lithium...[[id number]]                      Final result               Extra Purple Top Tube[[id number]]                            Final result                 Please view  results for these tests on the individual orders.   Extra Red Top Tube     Status: None   Result Value Ref [facility]    Hold Specimen JIC    Extra Green Top (Lithium Heparin) Tube     Status: None   Result Value Ref [facility]    Hold Specimen JIC    Extra Purple Top Tube     Status: None   Result Value Ref [facility]    Hold Specimen JIC    Extra Tube     Status: None    Narrative    The following orders were created for panel order Extra Tube.  Procedure                               Abnormality         Status                     ---------                               -----------         ------                     Extra Red Top Tube[[id number]]                               Final result               Extra Green Top (Lithium...[[id number]]                      Final result                 Please view results for these tests on the individual orders.   Extra Red Top Tube     Status: None   Result Value Ref [facility]    Hold Specimen JIC    Extra Green Top (Lithium Heparin) Tube     Status: None   Result Value Ref [facility]    Hold Specimen JIC    Urine Drugs of Abuse Screen     Status: Abnormal    Narrative    The following orders were created for panel order Urine Drugs of Abuse Screen.  Procedure                               Abnormality         Status                     ---------                               -----------         ------                     Urine Drugs of Abuse Scr...[[id number]]  Abnormal            Final result                 Please view results for these tests on the individual orders.

## 2022-08-08 NOTE — PLAN OF CARE
"  Problem: Behavioral Health Plan of Care  Goal: Patient-Specific Goal (Individualization)  Description: Pt will be free of delusional thinking at discharge.  Pt will attend group.  Pt will eat at least 50% of meals.  Pt will take medications as prescribed.  Pt will follow treatment tream recommendations.    8/5/22-Bed wedge used for sleep apnea.  Pt is able to have a tennis ball for stress.   Outcome: Ongoing, Progressing  Note: Report received from Ambreen OVERTON.  Rounding complete.  Patient observed awake and in the lounge at start of shift.    Patient is polite and cooperative with medications and assessment.  Patient is took scheduled medications this morning.  He would prefer to continue oral Abilify when he leaves but understands his Dr. Fuentes wants him to be on a RICHARDSON.  He feels like his mood is \"leveling out\".  He worries about his house being unlocked and paying bills that are due.  He feels like he is ready to discharge soon.  He would like to have someone to talk to when he discharges-support group, therapy, PTSD groups.   to follow up with [facility] for resources.  provided patient with information on VSO. He feels more comfortable with outpatient appointments in person.  He does not feel comfortable with telemedicine as the things he has to talk about are a \"matter of national security\".  Patient states he [facility]. When he started talking about things that were a \"matter of national security, the computer screen started glitching.\" This made him uncomfortable.      Patient c/o constipation.  PRN milk of mag administered at 1254.     Problem: Thought Process Alteration  Goal: Optimal Thought Clarity  Description: Patient will report a decrease in paranoid thoughts by discharge.   Outcome: Ongoing, Progressing  Note: Patient feels like his mood is \"leveling out\".              "

## 2022-08-08 NOTE — PROGRESS NOTES
Pt is discharging at the recommendation of the treatment team. Pt is discharging to home transported by friend . Pt denies having any thoughts of hurting themself or anyone else. Pt denies anxiety or depression. Pt has follow up with [facility] for PCP, Medication Management and therapy, [facility]. Discharge instructions, including; demographic sheet, psychiatric evaluation, discharge summary, and AVS were faxed to these next level of care providers.

## 2022-08-08 NOTE — PLAN OF CARE
Discharge Note    Patient Discharged to home on 8/8/2022 4:01 PM via Private Car accompanied by unit staff to meet friend who was providing transport home.     Patient informed of discharge instructions in AVS. patient verbalizes understanding and denies having any questions pertaining to AVS. Patient stable at time of discharge. Patient denies SI, HI, and thoughts of self harm at time of discharge. All personal belongings returned to patient. Discharge prescriptions sent to [facility], and picked up by writer via electronic communication. Psych evaluation, history and physical, AVS, and discharge summary faxed to next level of care- by [facility].     Autumn PENALOZA RN  8/8/2022  4:10 PM

## 2022-08-08 NOTE — PROGRESS NOTES
Behavioral Health Occupational Therapy Eval      Name: Anton Donovan MRN# [id number]   Age: 31 year old YOB: 1990     Date of Consultation: August 8, 2022  Primary care provider: No Ref-Primary, Physician    Referring Physician: Janee Lugo NP  Orders: Eval only   Medical Diagnosis: (L) shoulder pain  Onset of Illness/Injury: Patient notes having shoulder pain for a few weeks.     Prior Level of Function: Indep with ADL and IADLs.     Current Level of Function: Indep with ADL. Patient notes that the shoulder is painful when exercising. Does note that certain positions irritate the shoulder.   Applies kinesiotape to the supraspinatus. Discussed indications and contraindications with kinesiotape. Patient verbalized understanding. Discussed allowing that shoulder to heal: rest, ice etc. Patient verbalizes understanding. Full ROM present in (L) UE. Patient does indiate and occasional 'catching' in that arm.     Patient/Family Goal: Decrease pain to return to IADL of exercise.     Fall Screen:   Have you fallen 2 or more times in the last year? No  Have you fallen and had an injury in the last year? No  Timed up & go: NT  Is patient a fall risk? No    Past Medical History:   Past Medical History:   Diagnosis Date     Alcohol dependence (H)      Bipolar I disorder, most recent episode (or current) manic (H)      Chronic low back pain      Hereditary and idiopathic peripheral neuropathy      PTSD (post-traumatic stress disorder)      Sleep apnea      Tobacco use        Past Surgical History:  Past Surgical History:   Procedure Laterality Date     EXAM UNDER ANESTHESIA RECTUM N/A 9/7/2021    Procedure: EXAM UNDER ANESTHESIA, I&D of Abssess;  Surgeon: Kale Cruz MD;  Location: [facility] OR       Medications:   Current Facility-Administered Medications   Medication     acetaminophen (TYLENOL) tablet 650 mg     alum & mag hydroxide-simethicone (MAALOX) suspension 30 mL     ARIPiprazole (ABILIFY)  tablet 5 mg     hydrOXYzine (ATARAX) tablet 50 mg     ibuprofen (ADVIL/MOTRIN) tablet 600 mg     Lidocaine (LIDOCARE) 4 % Patch 1 patch     lidocaine patch in PLACE     magnesium hydroxide (MILK OF MAGNESIA) suspension 30 mL     melatonin tablet 3 mg     multivitamin w/minerals (THERA-VIT-M) tablet 1 tablet     nicotine (NICODERM CQ) 21 MG/24HR 24 hr patch 1 patch     nicotine (NICORETTE) gum 2 mg     nicotine Patch in Place     OLANZapine (zyPREXA) tablet 10 mg    Or     OLANZapine (zyPREXA) injection 10 mg     prazosin (MINIPRESS) capsule 1 mg     propranolol (INDERAL) tablet 10 mg     QUEtiapine (SEROquel) tablet 50 mg       Reason for OT Referral:  Mental Health History: History of PTSD and paranoia.   Signs/Symptoms of compliant: Admitted to this hospital upon recommendation from [facility].   Aggravating factors/Current Life Stressors: increase in paranoia and patient discontinued medications for a few months.   Current Services[facility].     Personal Information:  Family Structure: Single, has a 6 year old son.   Living Arrangement: Own home  Finances: VA income  Medication Management: [facility]  Support System: [facility], friends    Physical Presentation:   Mobility: WFL   Comfort/Pain: Pain in (L) shoulder      Self Care:   Nutrition: I do okay  Sleep Pattern: I do okay  Exercise: I do okay  Spiritual Practice: I do okay  Leisure: I do okay  Coping Skills: I do okay    Cognition:  Orientation:  time, place and person  Memory: Intact      Goals: Decrease pain in (L) UE to re-engage in IADL    Planned Interventions: k-tape    Clinical Impressions:  Criteria for Skilled Therapeutic Intervention Met:   OT Diagnosis: Pain impacting IADL function   Influenced by the following impairments: Pain impacting IADL function   Functional limitations due to impairment: Difficulty with routine exercise regime    Clinical presentation: Stable/Uncomplicated  Clinical presentation rationale:  appears to be an acute soft tissue injury. K-tape applied to supraspinatus.   Clinical Decision making (complexity): Low Complexity  Predicted Duration of Therapy Intervention (days/wks): eval only.   Risks and Benefits of therapy have been explained: Yes  Patient, Family & other staff in agreement with plan of care: Yes  Comments: OT to discharge at this time.     Total Evaluation Time: 23

## 2022-08-08 NOTE — PLAN OF CARE
Face to face end of shift report will be communicated to oncoming RN.    Problem: Behavioral Health Plan of Care  Goal: Patient-Specific Goal (Individualization)  Description: Pt will be free of delusional thinking at discharge.  Pt will attend group.  Pt will eat at least 50% of meals.  Pt will take medications as prescribed.  Pt will follow treatment tream recommendations.    8/5/22-Bed wedge used for sleep apnea.  Pt is able to have a tennis ball for stress.   Outcome: Ongoing, Progressing     Problem: Thought Process Alteration  Goal: Optimal Thought Clarity  Description: Patient will report a decrease in paranoid thoughts by discharge.   Outcome: Ongoing, Progressing  Face to face end of shift report obtained from TIAN Lr. Pt observed resting in bed.   Pt appears to be sleeping in bed with eyes closed, having regular respirations, and position changes. 15 minutes and PRN safety checks completed with no noted complains. No delusional comments noted or reported so far this shift.  0600-Pt appeared to had slept 5 hours. Pt did admit having some weird dreams and having nicotine patch on all night.

## 2022-08-09 LAB — B BURGDOR DNA SPEC QL NAA+PROBE: NOT DETECTED

## 2022-09-02 ENCOUNTER — DOCUMENTATION ONLY (OUTPATIENT)
Dept: SURGERY | Facility: OTHER | Age: 32
End: 2022-09-02

## 2022-09-02 NOTE — TELEPHONE ENCOUNTER
Patient's certified letter was returned today. Patient did not claim letter. [facility]. Michell Reyes LPN

## 2023-05-04 ENCOUNTER — HOSPITAL ENCOUNTER (EMERGENCY)
Facility: HOSPITAL | Age: 33
Discharge: HOME | End: 2023-05-04
Attending: EMERGENCY MEDICINE | Admitting: EMERGENCY MEDICINE
Payer: COMMERCIAL

## 2023-05-04 ENCOUNTER — TRANSFERRED RECORDS (OUTPATIENT)
Dept: HEALTH INFORMATION MANAGEMENT | Facility: CLINIC | Age: 33
End: 2023-05-04
Payer: OTHER GOVERNMENT

## 2023-05-04 VITALS
TEMPERATURE: 97.6 F | DIASTOLIC BLOOD PRESSURE: 80 MMHG | OXYGEN SATURATION: 96 % | SYSTOLIC BLOOD PRESSURE: 118 MMHG | HEART RATE: 95 BPM | RESPIRATION RATE: 16 BRPM

## 2023-05-04 DIAGNOSIS — R44.3 HALLUCINATIONS: ICD-10-CM

## 2023-05-04 LAB
AMPHETAMINES UR QL: DETECTED
ANION GAP SERPL CALCULATED.3IONS-SCNC: 10 MMOL/L (ref 7–15)
BARBITURATES UR QL SCN: NOT DETECTED
BASOPHILS # BLD AUTO: 0 10E3/UL (ref 0–0.2)
BASOPHILS NFR BLD AUTO: 1 %
BENZODIAZ UR QL SCN: NOT DETECTED
BUN SERPL-MCNC: 11.5 MG/DL (ref 6–20)
BUPRENORPHINE UR QL: NOT DETECTED
CALCIUM SERPL-MCNC: 9.8 MG/DL (ref 8.6–10)
CANNABINOIDS UR QL: DETECTED
CHLORIDE SERPL-SCNC: 102 MMOL/L (ref 98–107)
COCAINE UR QL SCN: NOT DETECTED
CREAT SERPL-MCNC: 0.8 MG/DL (ref 0.67–1.17)
D-METHAMPHET UR QL: NOT DETECTED
DEPRECATED HCO3 PLAS-SCNC: 25 MMOL/L (ref 22–29)
EOSINOPHIL # BLD AUTO: 0.3 10E3/UL (ref 0–0.7)
EOSINOPHIL NFR BLD AUTO: 4 %
ERYTHROCYTE [DISTWIDTH] IN BLOOD BY AUTOMATED COUNT: 11.6 % (ref 10–15)
ETHANOL SERPL-MCNC: <0.01 G/DL
GFR SERPL CREATININE-BSD FRML MDRD: >90 ML/MIN/1.73M2
GLUCOSE SERPL-MCNC: 92 MG/DL (ref 70–99)
HCT VFR BLD AUTO: 44.9 % (ref 40–53)
HGB BLD-MCNC: 16.3 G/DL (ref 13.3–17.7)
HOLD SPECIMEN: NORMAL
IMM GRANULOCYTES # BLD: 0 10E3/UL
IMM GRANULOCYTES NFR BLD: 0 %
LYMPHOCYTES # BLD AUTO: 2 10E3/UL (ref 0.8–5.3)
LYMPHOCYTES NFR BLD AUTO: 28 %
MCH RBC QN AUTO: 33.3 PG (ref 26.5–33)
MCHC RBC AUTO-ENTMCNC: 36.3 G/DL (ref 31.5–36.5)
MCV RBC AUTO: 92 FL (ref 78–100)
METHADONE UR QL SCN: NOT DETECTED
MONOCYTES # BLD AUTO: 0.6 10E3/UL (ref 0–1.3)
MONOCYTES NFR BLD AUTO: 9 %
NEUTROPHILS # BLD AUTO: 4.4 10E3/UL (ref 1.6–8.3)
NEUTROPHILS NFR BLD AUTO: 58 %
NRBC # BLD AUTO: 0 10E3/UL
NRBC BLD AUTO-RTO: 0 /100
OPIATES UR QL SCN: NOT DETECTED
OXYCODONE UR QL SCN: NOT DETECTED
PCP UR QL SCN: NOT DETECTED
PLATELET # BLD AUTO: 244 10E3/UL (ref 150–450)
POTASSIUM SERPL-SCNC: 4.1 MMOL/L (ref 3.4–5.3)
PROPOXYPH UR QL: NOT DETECTED
RBC # BLD AUTO: 4.89 10E6/UL (ref 4.4–5.9)
SARS-COV-2 RNA RESP QL NAA+PROBE: NEGATIVE
SODIUM SERPL-SCNC: 137 MMOL/L (ref 136–145)
TRICYCLICS UR QL SCN: NOT DETECTED
WBC # BLD AUTO: 7.3 10E3/UL (ref 4–11)

## 2023-05-04 PROCEDURE — U0003 INFECTIOUS AGENT DETECTION BY NUCLEIC ACID (DNA OR RNA); SEVERE ACUTE RESPIRATORY SYNDROME CORONAVIRUS 2 (SARS-COV-2) (CORONAVIRUS DISEASE [COVID-19]), AMPLIFIED PROBE TECHNIQUE, MAKING USE OF HIGH THROUGHPUT TECHNOLOGIES AS DESCRIBED BY CMS-2020-01-R: HCPCS | Performed by: NURSE PRACTITIONER

## 2023-05-04 PROCEDURE — 85004 AUTOMATED DIFF WBC COUNT: CPT | Performed by: EMERGENCY MEDICINE

## 2023-05-04 PROCEDURE — 99283 EMERGENCY DEPT VISIT LOW MDM: CPT

## 2023-05-04 PROCEDURE — C9803 HOPD COVID-19 SPEC COLLECT: HCPCS

## 2023-05-04 PROCEDURE — 80048 BASIC METABOLIC PNL TOTAL CA: CPT | Performed by: EMERGENCY MEDICINE

## 2023-05-04 PROCEDURE — 36415 COLL VENOUS BLD VENIPUNCTURE: CPT | Performed by: NURSE PRACTITIONER

## 2023-05-04 PROCEDURE — 80306 DRUG TEST PRSMV INSTRMNT: CPT | Performed by: EMERGENCY MEDICINE

## 2023-05-04 PROCEDURE — 250N000013 HC RX MED GY IP 250 OP 250 PS 637: Performed by: NURSE PRACTITIONER

## 2023-05-04 PROCEDURE — 82077 ASSAY SPEC XCP UR&BREATH IA: CPT | Performed by: NURSE PRACTITIONER

## 2023-05-04 PROCEDURE — 99284 EMERGENCY DEPT VISIT MOD MDM: CPT | Performed by: NURSE PRACTITIONER

## 2023-05-04 RX ORDER — NICOTINE 21 MG/24HR
1 PATCH, TRANSDERMAL 24 HOURS TRANSDERMAL DAILY
Status: DISCONTINUED | OUTPATIENT
Start: 2023-05-04 | End: 2023-05-04 | Stop reason: HOSPADM

## 2023-05-04 RX ORDER — HYDROXYZINE HYDROCHLORIDE 25 MG/1
50 TABLET, FILM COATED ORAL ONCE
Status: COMPLETED | OUTPATIENT
Start: 2023-05-04 | End: 2023-05-04

## 2023-05-04 RX ADMIN — HYDROXYZINE HYDROCHLORIDE 50 MG: 25 TABLET, FILM COATED ORAL at 15:42

## 2023-05-04 RX ADMIN — NICOTINE 1 PATCH: 21 PATCH, EXTENDED RELEASE TRANSDERMAL at 15:42

## 2023-05-04 ASSESSMENT — ACTIVITIES OF DAILY LIVING (ADL)
ADLS_ACUITY_SCORE: 35
ADLS_ACUITY_SCORE: 35

## 2023-05-04 ASSESSMENT — ENCOUNTER SYMPTOMS
HEMATOLOGIC/LYMPHATIC NEGATIVE: 1
NEUROLOGICAL NEGATIVE: 1
CARDIOVASCULAR NEGATIVE: 1
ENDOCRINE NEGATIVE: 1
RESPIRATORY NEGATIVE: 1
GASTROINTESTINAL NEGATIVE: 1
HALLUCINATIONS: 1
ALLERGIC/IMMUNOLOGIC NEGATIVE: 1
MUSCULOSKELETAL NEGATIVE: 1
EYES NEGATIVE: 1
CONSTITUTIONAL NEGATIVE: 1

## 2023-05-04 NOTE — ED PROVIDER NOTES
"  History     Chief Complaint   Patient presents with     Hallucinations     HPI   Anton Donovan is a 32 year old individual with history of PTSD, alcohol dependence in remission, paranoia, major depressive disorder, [facility]   Apparently patient has been having auditory and visual hallucinations for about 3 months.  [facility] and patient is agreeable to [facility].  Sent to the ER for this.   Patient alert and oriented upon arrival.  Denies suicidal or homicidal ideation.  Does \"get sick of the voice\" and hallucinations.  States that the voice only says for phrases.  These are \"go to hell, snake, Facebook\" and 1 other thing.   Patient states he uses about 4 cans of beer 4 times a week.  Only smokes weed and no other recreational drug use.  States his only medication is an injection monthly and last one was on 4/19/2023.    Allergies:  Allergies   Allergen Reactions     Shellfish Allergy      Shellfish-Derived Products      Penicillins Unknown, Rash and Dermatitis       Problem List:    Patient Active Problem List    Diagnosis Date Noted     Chronic low back pain 08/04/2022     Priority: Medium     Idiopathic peripheral neuropathy 08/04/2022     Priority: Medium     Chronic pain 08/04/2022     Priority: Medium     Deferred diagnosis on axis I 08/04/2022     Priority: Medium     Encounter for occupational therapy 08/04/2022     Priority: Medium     Finding of above normal blood pressure 08/04/2022     Priority: Medium     Major depressive disorder 08/04/2022     Priority: Medium     Paranoia (H) 08/03/2022     Priority: Medium     Alcohol dependence in remission (H) 07/26/2022     Priority: Medium     PTSD (post-traumatic stress disorder) 05/31/2022     Priority: Medium     Anal fistula 08/24/2021     Priority: Medium     Added automatically from request for surgery [id number]          Past Medical History:    Past Medical History:   Diagnosis Date     " Alcohol dependence (H)      Bipolar I disorder, most recent episode (or current) manic (H)      Chronic low back pain      Hereditary and idiopathic peripheral neuropathy      PTSD (post-traumatic stress disorder)      Sleep apnea      Tobacco use        Past Surgical History:    Past Surgical History:   Procedure Laterality Date     EXAM UNDER ANESTHESIA RECTUM N/A 9/7/2021    Procedure: EXAM UNDER ANESTHESIA, I&D of Abssess;  Surgeon: Kale Cruz MD;  Location: [facility] OR       Family History:    Family History   Problem Relation Age of Onset     Bladder Cancer Mother      Throat cancer Father        Social History:  Marital Status:  Legally  [3]  Social History     Tobacco Use     Smoking status: Every Day     Packs/day: 1.00     Types: Cigarettes     Start date: 9/20/2018     Smokeless tobacco: Former     Quit date: 9/20/2018     Tobacco comments:     declines quitplan referral 9/21/2021   Substance Use Topics     Alcohol use: Yes     Comment: few times a week     Drug use: Yes     Types: Marijuana     Comment: few times a week        Medications:    ARIPiprazole (ABILIFY) 5 MG tablet  melatonin 3 MG tablet  multivitamin, therapeutic (THERA-VIT) TABS tablet  nicotine (NICODERM CQ) 21 MG/24HR 24 hr patch  prazosin (MINIPRESS) 1 MG capsule  propranolol (INDERAL) 20 MG tablet  QUEtiapine (SEROQUEL) 50 MG tablet          Review of Systems   Constitutional: Negative.    HENT: Negative.    Eyes: Negative.    Respiratory: Negative.    Cardiovascular: Negative.    Gastrointestinal: Negative.    Endocrine: Negative.    Genitourinary: Negative.    Musculoskeletal: Negative.    Skin: Negative.    Allergic/Immunologic: Negative.    Neurological: Negative.    Hematological: Negative.    Psychiatric/Behavioral: Positive for hallucinations. Negative for suicidal ideas.       Physical Exam   BP: 118/80  Pulse: 95  Temp: 97.6  F (36.4  C)  Resp: 16  SpO2: 96 %      Physical Exam  Vitals and nursing note reviewed.    Constitutional:       Appearance: Normal appearance.   Cardiovascular:      Rate and Rhythm: Normal rate and regular rhythm.      Pulses: Normal pulses.      Heart sounds: Normal heart sounds.   Pulmonary:      Effort: Pulmonary effort is normal.      Breath sounds: Normal breath sounds.   Skin:     General: Skin is warm and dry.   Neurological:      General: No focal deficit present.      Mental Status: He is alert and oriented to person, place, and time.   Psychiatric:         Attention and Perception: Attention normal. He perceives auditory and visual hallucinations.         Mood and Affect: Mood and affect normal.         Speech: Speech normal.         Behavior: Behavior normal. Behavior is cooperative.         Thought Content: Thought content does not include homicidal or suicidal ideation. Thought content does not include homicidal or suicidal plan.         Cognition and Memory: Cognition and memory normal.         Judgment: Judgment normal.         ED Course     Mental Health Risk Assessment      PSS-3    Date and Time Over the past 2 weeks have you felt down, depressed, or hopeless? Over the past 2 weeks have you had thoughts of killing yourself? Have you ever attempted to kill yourself? When did this last happen? User   05/04/23 1134 yes yes no -- MJA      C-SSRS ([place])    Date and Time Q1 Wished to be Dead (Past Month) Q2 Suicidal Thoughts (Past Month) Q3 Suicidal Thought Method Q4 Suicidal Intent without Specific Plan Q5 Suicide Intent with Specific Plan Q6 Suicide Behavior (Lifetime) Within the Past 3 Months? RETIRED: Level of Risk per Screen Screening Not Complete User   05/04/23 1134 no no no no no no -- -- -- MJA                    ED Course as of 05/04/23 1630   Thu May 04, 2023   1210 In to see patient and history/physical completed.    1330 Lab work returned.  Note to be [facility] for [facility].   1500 Contacted [facility] about transfer and still awaiting decision.  [facility]  "apparently will call back within the hour with decision.   1535 Patient requesting nicotine patch and something for anxiety.  NicoDerm 21 mg patch ordered and hydroxyzine 50 mg.   1625 Contacted the [facility] to inquire why no call back as occurred.  Received information that \"patient did have tentative acceptance but groundwork information for admission had not been completed so afternoon shift would need to be contacted\".  Discussed that as patient is here voluntarily, not on psychiatric hold, not suicidal, not homicidal, that patient would like to be discharged at this time.  I am in agreement with patient.  Informed that patient he will be discharged per his request.              Results for orders placed or performed during the hospital encounter of 05/04/23 (from the past 24 hour(s))   [facility] Draw *Canceled*    Narrative    The following orders were created for panel order [facility] Draw.  Procedure                               Abnormality         Status                     ---------                               -----------         ------                       Please view results for these tests on the individual orders.   Asymptomatic COVID-19 Virus (Coronavirus) by PCR Nasopharyngeal    Specimen: Nasopharyngeal; Swab   Result Value Ref Range    SARS CoV2 PCR Negative Negative    Narrative    Testing was performed using the Xpert Xpress SARS-CoV-2 Assay on the Cepheid Gene-Xpert Instrument Systems. Additional information about this Emergency Use Authorization (EUA) assay can be found via the Lab Guide. This test should be ordered for the detection of SARS-CoV-2 in individuals who meet SARS-CoV-2 clinical and/or epidemiological criteria as well as from individuals without symptoms or other reasons to suspect COVID-19. Test performance for asymptomatic patients has only been established in anterior nasal swab specimens. This test is for in vitro diagnostic use under the FDA EUA for laboratories certified under CLIA " to perform high complexity testing. This test has not been FDA cleared or approved. A negative result does not rule out the presence of PCR inhibitors in the specimen or target RNA concentration below the limit of detection for the assay. The possibility of a false negative should be considered if the patient's recent exposure or clinical presentation suggests COVID-19. This test was validated by [facility] laboratory. This laboratory is certified under the Clinical Laboratory Improvement Amendments (CLIA) as qualified to perform high complexity testing.   CBC with platelets differential    Narrative    The following orders were created for panel order CBC with platelets differential.  Procedure                               Abnormality         Status                     ---------                               -----------         ------                     CBC with platelets and d...[[id number]]  Abnormal            Final result                 Please view results for these tests on the individual orders.   Basic metabolic panel   Result Value Ref Range    Sodium 137 136 - 145 mmol/L    Potassium 4.1 3.4 - 5.3 mmol/L    Chloride 102 98 - 107 mmol/L    Carbon Dioxide (CO2) 25 22 - 29 mmol/L    Anion Gap 10 7 - 15 mmol/L    Urea Nitrogen 11.5 6.0 - 20.0 mg/dL    Creatinine 0.80 0.67 - 1.17 mg/dL    Calcium 9.8 8.6 - 10.0 mg/dL    Glucose 92 70 - 99 mg/dL    GFR Estimate >90 >60 mL/min/1.73m2   Ethyl Alcohol Level   Result Value Ref Range    Alcohol ethyl <0.01 <=0.01 g/dL   CBC with platelets and differential   Result Value Ref Range    WBC Count 7.3 4.0 - 11.0 10e3/uL    RBC Count 4.89 4.40 - 5.90 10e6/uL    Hemoglobin 16.3 13.3 - 17.7 g/dL    Hematocrit 44.9 40.0 - 53.0 %    MCV 92 78 - 100 fL    MCH 33.3 (H) 26.5 - 33.0 pg    MCHC 36.3 31.5 - 36.5 g/dL    RDW 11.6 10.0 - 15.0 %    Platelet Count 244 150 - 450 10e3/uL    % Neutrophils 58 %    % Lymphocytes 28 %    % Monocytes 9 %    % Eosinophils 4  %    % Basophils 1 %    % Immature Granulocytes 0 %    NRBCs per 100 WBC 0 <1 /100    Absolute Neutrophils 4.4 1.6 - 8.3 10e3/uL    Absolute Lymphocytes 2.0 0.8 - 5.3 10e3/uL    Absolute Monocytes 0.6 0.0 - 1.3 10e3/uL    Absolute Eosinophils 0.3 0.0 - 0.7 10e3/uL    Absolute Basophils 0.0 0.0 - 0.2 10e3/uL    Absolute Immature Granulocytes 0.0 <=0.4 10e3/uL    Absolute NRBCs 0.0 10e3/uL   Extra Tube    Narrative    The following orders were created for panel order Extra Tube.  Procedure                               Abnormality         Status                     ---------                               -----------         ------                     Extra Blue Top Tube[[id number]]                              Final result               Extra Red Top Tube[[id number]]                               Final result               Extra Heparinized Syringe[[id number]]                        Final result                 Please view results for these tests on the individual orders.   Extra Blue Top Tube   Result Value Ref Range    Hold Specimen JIC    Extra Red Top Tube   Result Value Ref Range    Hold Specimen JIC    Extra Heparinized Syringe   Result Value Ref Range    Hold Specimen JIC    Urine Drugs of Abuse Screen    Narrative    The following orders were created for panel order Urine Drugs of Abuse Screen.  Procedure                               Abnormality         Status                     ---------                               -----------         ------                     Urine Drugs of Abuse Scr...[[id number]]  Abnormal            Final result                 Please view results for these tests on the individual orders.   Urine Drugs of Abuse Screen Panel 13   Result Value Ref Range    Cannabinoids (58-gkc-9-carboxy-9-THC) Detected (A) Not Detected, Indeterminate    Phencyclidine Not Detected Not Detected, Indeterminate    Cocaine (Benzoylecgonine) Not Detected Not Detected, Indeterminate    Methamphetamine  (d-Methamphetamine) Not Detected Not Detected, Indeterminate    Opiates (Morphine) Not Detected Not Detected, Indeterminate    Amphetamine (d-Amphetamine) Detected (A) Not Detected, Indeterminate    Benzodiazepines (Nordiazepam) Not Detected Not Detected, Indeterminate    Tricyclic Antidepressants (Desipramine) Not Detected Not Detected, Indeterminate    Methadone Not Detected Not Detected, Indeterminate    Barbiturates (Butalbital) Not Detected Not Detected, Indeterminate    Oxycodone Not Detected Not Detected, Indeterminate    Propoxyphene (Norpropoxyphene) Not Detected Not Detected, Indeterminate    Buprenorphine Not Detected Not Detected, Indeterminate       Medications   nicotine (NICODERM CQ) 21 MG/24HR 24 hr patch 1 patch (1 patch Transdermal $Patch/Med Applied 5/4/23 1542)   hydrOXYzine (ATARAX) tablet 50 mg (50 mg Oral $Given 5/4/23 1542)       Assessments & Plan (with Medical Decision Making)     I have reviewed the nursing notes.    I have reviewed the findings, diagnosis, plan and need for follow up with the patient.    Summary:  Patient presents to the ER today for hallucinations.  Potential diagnosis which have been considered and evaluated include psychiatric disorder, drug ingestion, electrolyte imbalance, as well as others. Many of these have been excluded using the various modalities and assessment as noted on the chart. At the present time, the diagnosis given seems to be the most likely visual and auditory hallucinations.  Upon arrival, vitals signs are normal.  The patient is alert and oriented and calm and cooperative.  Patient verbalizes auditory and visual hallucinations.  Denies any suicidal or homicidal ideation.  Denies any current recreational drug use other than weed and mild alcohol use.  [facility].  Lab work conducted showing negative alcohol, normal electrolytes, and tox  screen showing cannabinoids and amphetamines..  [facility]  Requesting notes at this time.  Note is signed and [facility] to the [facility] for [facility].  Patient did become anxious during this time and did receive hydroxyzine 50 mg with no improvement.  Did receive nicotine patch per her request.  This facility did contact the [facility] transfer line multiple times to discuss hold-up with multiple excuses.  Lastly patient was getting very anxious and wanted discharge.  [facility] and was informed that patient had tentative acceptance but groundwork admitting information was not conducted and had to be done by afternoon shift.  At this time patient would like to go home.  As patient is not on hold, is not suicidal, is not homicidal, will discharge home per her request.  Advised patient to follow-up with PCP and psychiatrist.  Return to ER if any suicidal or homicidal ideation does occur.  Patient verbalized understand agrees with plan of care.  Patient discharged from the ER.        Impression and plan discussed with patient. Questions answered, concerns addressed, indications for urgent re-evaluation reviewed, and  given. Patient/Parent/Caregiver agree with treatment plan and have no further questions at this time.      This note was created by the Dragon Voice Dictation System. Inadvertent typographical errors, due to software recognition problems, may still exist.        New Prescriptions    No medications on file       Final diagnoses:   Hallucinations       5/4/2023   [facility]     Brad Garrido APRN CNP  05/04/23 1332       Brad Garrido APRN CNP  05/04/23 1637

## 2023-05-04 NOTE — ED TRIAGE NOTES
"Patient presents with c/o hearing and seeing things, voices tell him to \"kill himself, and to burn in hell ,and a frequency like i'm trying to  a radio channel\" Patient reports that he sees spirits, streaks of light, streaks of darkness, streak of gold.\" Patient reports that he has been having visual hallucinations for about a year and auditory hallucinations for 3/4 months. Patient denies any suicidal ideation, but reports that the voices are telling him to kill himself. Patient reports that he has taken drives in his car without his phone to see if he still hears the voices/sounds and reports that he does, so it must be coming from the radio.       "

## 2023-05-04 NOTE — DISCHARGE INSTRUCTIONS
The number to the [facility] and the mobile crisis program is [phone]     They are there to make assessment and stabilization for adults     They are available 24 hours a day if you are having or experiencing the following:      Experiencing an emotional crisis     Feeling suicidal or homicidal     Expressing difficulty accessing community resources     Having difficulty managing mental health symptoms     OR     Please call the Crisis Line if you need emergency psychiatric help or are thinking of harming yourself.     You may use the below 24 Hour Crisis Hotline Numbers for support as needed:  National Crisis #: 2-217-669-TALK (8094)  Text for Life: Text the word LIFE to 74154

## 2023-05-04 NOTE — ED NOTES
Pt updated on plan. Notified pt that we are waiting for [facility] in [facility] to return call as we sent all info there. Pt states understanding at this time. Pt is eating lunch at this time.

## 2023-05-04 NOTE — ED NOTES
Pt reports symptoms/hallucinations have been increasing since he has been here in the hospital. Reassured pt that we are working on a plan for him and we are just waiting to hear back from the [facility]. Scheduled meds given.

## 2023-05-04 NOTE — ED NOTES
"Pt states \"I dont want to harm myself but the voices in my head are telling me too. And I am seeing things. Funny thing is that I caught the things that I have been seeing on camera even my friends have seen them.\". Pt changed into paper scrubs, all belongings removed, and pt wanded by security.   "

## 2023-05-04 NOTE — ED NOTES
Marti [facility] the  [facility] [place] called, she was wondering if the patient was admitted to our facility.   Told her we do not have any mental health beds available, she told me there were mental health beds available in the [facility] (phone: [phone]) but wanted [facility] ED to facilitate the transfer.

## 2023-05-04 NOTE — ED NOTES
Care Transitions focused note:      Call to [facility] referral line.  They are requesting notes to be faxed.  Notes faxed to provided fax number [phone]  Provider note, labs etc sent.      HEATHER Ledbetter

## 2023-08-17 ENCOUNTER — OFFICE VISIT (OUTPATIENT)
Dept: SURGERY | Facility: OTHER | Age: 33
End: 2023-08-17
Attending: CLINICAL NURSE SPECIALIST
Payer: COMMERCIAL

## 2023-08-17 ENCOUNTER — PREP FOR PROCEDURE (OUTPATIENT)
Dept: SURGERY | Facility: OTHER | Age: 33
End: 2023-08-17

## 2023-08-17 VITALS
DIASTOLIC BLOOD PRESSURE: 80 MMHG | BODY MASS INDEX: 29.16 KG/M2 | HEART RATE: 89 BPM | OXYGEN SATURATION: 98 % | SYSTOLIC BLOOD PRESSURE: 104 MMHG | WEIGHT: 220 LBS | HEIGHT: 73 IN | TEMPERATURE: 96.6 F

## 2023-08-17 DIAGNOSIS — L05.91 CHRONIC RECURRENT PILONIDAL CYST: Primary | ICD-10-CM

## 2023-08-17 DIAGNOSIS — L05.91 PILONIDAL CYST: Primary | ICD-10-CM

## 2023-08-17 PROBLEM — F10.10 ALCOHOL ABUSE: Status: ACTIVE | Noted: 2023-08-17

## 2023-08-17 PROBLEM — F10.20 ALCOHOL DEPENDENCE (H): Status: ACTIVE | Noted: 2023-08-17

## 2023-08-17 PROCEDURE — 99203 OFFICE O/P NEW LOW 30 MIN: CPT | Performed by: SURGERY

## 2023-08-17 RX ORDER — OLANZAPINE 20 MG/1
10 TABLET ORAL 2 TIMES DAILY
COMMUNITY

## 2023-08-17 ASSESSMENT — PAIN SCALES - GENERAL: PAINLEVEL: MODERATE PAIN (5)

## 2023-08-17 NOTE — PATIENT INSTRUCTIONS
Thank you for allowing Dr. Campbell and our surgical team to participate in your care. Please call our health unit coordinator at [phone] with scheduling questions or the nurse at [phone] with any other questions or concerns.      You have been scheduled for: Pilonidal Cyst Debridement with  on 9/12/23.     Please see handout for additional instruction.  You will need a pre-operative appointment with your primary care provider.  You may call [phone] or [phone] with any questions.

## 2023-08-23 NOTE — PROGRESS NOTES
[facility] Surgery Consultation    CC:  Pain and swelling in gluteal cleft     HPI:  This 32 year old year old male is [facility] of pain and drainage from gluteal cleft. He reports having pain and swelling in this area in the past. He has not had surgery in this area before. He does have 5/10 pain today worse with sitting. No fevers. He has had an exam under anesthesia in the past in different location. He does live with an ex-girlfriend and would be able to wound care for him if necessary. He has been hospitalized for psych reasons fairly recently.     Past Medical History:   Diagnosis Date    Alcohol dependence (H)     Bipolar I disorder, most recent episode (or current) manic (H)     Chronic low back pain     Hereditary and idiopathic peripheral neuropathy     PTSD (post-traumatic stress disorder)     Sleep apnea     Tobacco use        Past Surgical History:   Procedure Laterality Date    EXAM UNDER ANESTHESIA RECTUM N/A 9/7/2021    Procedure: EXAM UNDER ANESTHESIA, I&D of Abssess;  Surgeon: Kale Cruz MD;  Location: [facility] OR       Allergies   Allergen Reactions    Penicillin G     Shellfish Allergy     Shellfish-Derived Products     Shrimp Other (See Comments)    Penicillins Unknown, Rash and Dermatitis       Current Outpatient Medications   Medication    nicotine (NICODERM CQ) 21 MG/24HR 24 hr patch    OLANZapine (ZYPREXA) 20 MG tablet     No current facility-administered medications for this visit.       HABITS:    Social History     Tobacco Use    Smoking status: Every Day     Packs/day: 1.00     Types: Cigarettes     Start date: 9/20/2018    Smokeless tobacco: Former     Quit date: 9/20/2018    Tobacco comments:     declines quitplan referral 9/21/2021   Substance Use Topics    Alcohol use: Yes     Comment: few times a week    Drug use: Yes     Types: Marijuana     Comment: few times a week       Family History   Problem Relation Age of Onset    Bladder  "Cancer Mother     Throat cancer Father        REVIEW OF SYSTEMS:  Ten point review of systems negative except those mentioned in the HPI.     OBJECTIVE:    /80 (BP Location: Right arm, Cuff Size: Adult Large)   Pulse 89   Temp (!) 96.6  F (35.9  C) (Tympanic)   Ht 1.854 m (6' 1\")   Wt 99.8 kg (220 lb)   SpO2 98%   BMI 29.03 kg/m      GENERAL: Generally appears well, in no distress with appropriate affect.  HEENT:   Sclerae anicteric - normocephalic atraumatic   Respiratory:  No acute distress, no splinting   Cardiovascular:  Regular Rate and Rhythm  Abdomen: there is small induration with a midline pit noted. Does not span very far.   :  deferred  Extremities:  Extremities normal. No deformities, edema, or skin discoloration.  Skin:  no suspicious lesions or rashes  Neurological: grossly intact  Psych:  Alert, oriented, affect appropriate with normal decision making ability.    IMPRESSION:    Relatively small tract pilonidal cyst, discussed pathophysiology of this and plans for debridement and healing by secondary intention. Did discuss possible flap closure and referral if would like to try this. As [facility] through the [facility] would like him to be seen for a formal pre-op. He does believe with someone can help ant home for wound care.     PLAN:    Pilonidal cyst debridement.     Chan Campbell MD,     8/23/2023  2:45 PM      "

## 2023-09-05 ENCOUNTER — TRANSFERRED RECORDS (OUTPATIENT)
Dept: HEALTH INFORMATION MANAGEMENT | Facility: HOSPITAL | Age: 33
End: 2023-09-05

## 2023-09-05 LAB
CREATININE (EXTERNAL): 0.8 MG/DL (ref 0.7–1.2)
GFR ESTIMATED (EXTERNAL): >90 ML/MIN/1.73M2
GLUCOSE (EXTERNAL): 102 MG/DL (ref 74–106)
POTASSIUM (EXTERNAL): 4.1 MMOL/L (ref 3.5–5)

## 2023-09-05 NOTE — OR NURSING
Patient denies currently taking NSAIDs, ASA, vitamins or supplements, Instructed to continue olanzipine, including day of surgery, no smoking after midnight.

## 2023-09-08 ENCOUNTER — ANESTHESIA EVENT (OUTPATIENT)
Dept: SURGERY | Facility: HOSPITAL | Age: 33
End: 2023-09-08
Payer: COMMERCIAL

## 2023-09-08 NOTE — ANESTHESIA PREPROCEDURE EVALUATION
Anesthesia Pre-Procedure Evaluation    Patient: Anton Donovan   MRN: [id number] : 1990        Procedure : Procedure(s):  Pilonidal cyst debridement          Past Medical History:   Diagnosis Date     Alcohol dependence (H)      Bipolar I disorder, most recent episode (or current) manic (H)      Chronic low back pain      Hereditary and idiopathic peripheral neuropathy      PTSD (post-traumatic stress disorder)      Sleep apnea      Tobacco use       Past Surgical History:   Procedure Laterality Date     EXAM UNDER ANESTHESIA RECTUM N/A 2021    Procedure: EXAM UNDER ANESTHESIA, I&D of Abssess;  Surgeon: Kale Cruz MD;  Location: [facility]      Allergies   Allergen Reactions     Penicillin G      Shellfish Allergy      Shellfish-Derived Products      Shrimp Other (See Comments)     Penicillins Unknown, Rash and Dermatitis      Social History     Tobacco Use     Smoking status: Every Day     Packs/day: 1.00     Types: Cigarettes     Start date: 2018     Smokeless tobacco: Former     Quit date: 2018     Tobacco comments:     declines quitplan referral 2021   Substance Use Topics     Alcohol use: Yes     Comment: few times a week      Wt Readings from Last 1 Encounters:   23 99.8 kg (220 lb)        Anesthesia Evaluation   Pt has had prior anesthetic. Type: MAC.    No history of anesthetic complications       ROS/MED HX  ENT/Pulmonary:     (+) sleep apnea, doesn't use CPAP,              tobacco use (cigs), Current use, 1 packs/day,                     Neurologic: Comment: Hereditary idiopathic peripheral neuropathy    (+)    peripheral neuropathy,                            Cardiovascular:     (+)  - -   -  - -                          Irregular Heartbeat/Palpitations (SVT ),            METS/Exercise Tolerance: >4 METS    Hematologic:  - neg hematologic  ROS     Musculoskeletal: Comment: sciatica  Chronic low back pain      GI/Hepatic: Comment: Anal fistula         Renal/Genitourinary:  - neg Renal ROS     Endo:  - neg endo ROS     Psychiatric/Substance Use:     (+) psychiatric history bipolar, other (comment) and depression (PTSD) alcohol abuse (alcohol dependence)  Recreational drug usage: Cannabis (none for two weeks).    Infectious Disease:  - neg infectious disease ROS     Malignancy:  - neg malignancy ROS     Other:  - neg other ROS    (+)  , H/O Chronic Pain,         Physical Exam    Airway        Mallampati: III   TM distance: > 3 FB   Neck ROM: full   Mouth opening: > 3 cm    Respiratory Devices and Support         Dental       (+) Minor Abnormalities - some fillings, tiny chips      Cardiovascular   cardiovascular exam normal       Rhythm and rate: regular and normal     Pulmonary   pulmonary exam normal        breath sounds clear to auscultation       OUTSIDE LABS:  CBC:   Lab Results   Component Value Date    WBC 7.3 05/04/2023    WBC 8.2 07/26/2022    HGB 16.3 05/04/2023    HGB 17.3 07/26/2022    HCT 44.9 05/04/2023    HCT 49.3 07/26/2022     05/04/2023     07/26/2022     BMP:   Lab Results   Component Value Date     05/04/2023     07/26/2022    POTASSIUM 4.1 05/04/2023    POTASSIUM 3.7 07/26/2022    CHLORIDE 102 05/04/2023    CHLORIDE 106 07/26/2022    CO2 25 05/04/2023    CO2 28 07/26/2022    BUN 11.5 05/04/2023    BUN 9 07/26/2022    CR 0.80 05/04/2023    CR 0.85 07/26/2022    GLC 92 05/04/2023     (H) 07/26/2022     COAGS: No results found for: PTT, INR, FIBR  POC: No results found for: BGM, HCG, HCGS  HEPATIC: No results found for: ALBUMIN, PROTTOTAL, ALT, AST, GGT, ALKPHOS, BILITOTAL, BILIDIRECT, OSIRIS  OTHER:   Lab Results   Component Value Date    ALMA 9.8 05/04/2023       Anesthesia Plan    ASA Status:  3    NPO Status:  NPO Appropriate (0630 with med)    Anesthesia Type: General.     - Airway: ETT              Consents    Anesthesia Plan(s) and associated risks, benefits, and realistic alternatives discussed. Questions  answered and patient/representative(s) expressed understanding.     - Discussed:     - Discussed with:  Patient            Postoperative Care            Comments:    Other Comments: Consult note doesn't have lung assessment   [facility] preop 9/5/23  Anxiety today    Prone general    Discussed risks and benefits with patient for general anesthesia including sore throat, nausea, vomiting, aspiration, dental damage, loss of airway, CV complications, stroke, MI, death. Pt wishes to proceed.             ROSANNA Huertas CRNA

## 2023-09-12 ENCOUNTER — ANESTHESIA (OUTPATIENT)
Dept: SURGERY | Facility: HOSPITAL | Age: 33
End: 2023-09-12
Payer: COMMERCIAL

## 2023-09-12 ENCOUNTER — HOSPITAL ENCOUNTER (OUTPATIENT)
Facility: HOSPITAL | Age: 33
Discharge: HOME | End: 2023-09-12
Attending: SURGERY | Admitting: SURGERY
Payer: COMMERCIAL

## 2023-09-12 VITALS
OXYGEN SATURATION: 94 % | DIASTOLIC BLOOD PRESSURE: 86 MMHG | RESPIRATION RATE: 18 BRPM | SYSTOLIC BLOOD PRESSURE: 131 MMHG | BODY MASS INDEX: 32.2 KG/M2 | TEMPERATURE: 97.6 F | WEIGHT: 243 LBS | HEART RATE: 73 BPM | HEIGHT: 73 IN

## 2023-09-12 DIAGNOSIS — G89.18 ACUTE POST-OPERATIVE PAIN: Primary | ICD-10-CM

## 2023-09-12 PROCEDURE — 250N000009 HC RX 250: Performed by: SURGERY

## 2023-09-12 PROCEDURE — 11771 EXC PILONIDAL CYST XTNSV: CPT | Performed by: SURGERY

## 2023-09-12 PROCEDURE — 258N000003 HC RX IP 258 OP 636: Performed by: NURSE ANESTHETIST, CERTIFIED REGISTERED

## 2023-09-12 PROCEDURE — 370N000017 HC ANESTHESIA TECHNICAL FEE, PER MIN: Performed by: SURGERY

## 2023-09-12 PROCEDURE — 710N000012 HC RECOVERY PHASE 2, PER MINUTE: Performed by: SURGERY

## 2023-09-12 PROCEDURE — 710N000010 HC RECOVERY PHASE 1, LEVEL 2, PER MIN: Performed by: SURGERY

## 2023-09-12 PROCEDURE — 11771 EXC PILONIDAL CYST XTNSV: CPT | Performed by: NURSE ANESTHETIST, CERTIFIED REGISTERED

## 2023-09-12 PROCEDURE — 250N000011 HC RX IP 250 OP 636: Performed by: NURSE ANESTHETIST, CERTIFIED REGISTERED

## 2023-09-12 PROCEDURE — 250N000009 HC RX 250: Performed by: NURSE ANESTHETIST, CERTIFIED REGISTERED

## 2023-09-12 PROCEDURE — 999N000141 HC STATISTIC PRE-PROCEDURE NURSING ASSESSMENT: Performed by: SURGERY

## 2023-09-12 PROCEDURE — 250N000011 HC RX IP 250 OP 636: Mod: JZ | Performed by: SURGERY

## 2023-09-12 PROCEDURE — 272N000001 HC OR GENERAL SUPPLY STERILE: Performed by: SURGERY

## 2023-09-12 PROCEDURE — 250N000013 HC RX MED GY IP 250 OP 250 PS 637: Performed by: SURGERY

## 2023-09-12 PROCEDURE — 250N000025 HC SEVOFLURANE, PER MIN: Performed by: SURGERY

## 2023-09-12 PROCEDURE — 360N000075 HC SURGERY LEVEL 2, PER MIN: Performed by: SURGERY

## 2023-09-12 RX ORDER — CEFAZOLIN SODIUM/WATER 2 G/20 ML
2 SYRINGE (ML) INTRAVENOUS
Status: COMPLETED | OUTPATIENT
Start: 2023-09-12 | End: 2023-09-12

## 2023-09-12 RX ORDER — OXYCODONE HYDROCHLORIDE 5 MG/1
5 TABLET ORAL
Status: COMPLETED | OUTPATIENT
Start: 2023-09-12 | End: 2023-09-12

## 2023-09-12 RX ORDER — NALOXONE HYDROCHLORIDE 0.4 MG/ML
0.2 INJECTION, SOLUTION INTRAMUSCULAR; INTRAVENOUS; SUBCUTANEOUS
Status: DISCONTINUED | OUTPATIENT
Start: 2023-09-12 | End: 2023-09-12 | Stop reason: HOSPADM

## 2023-09-12 RX ORDER — CEFAZOLIN SODIUM/WATER 2 G/20 ML
2 SYRINGE (ML) INTRAVENOUS SEE ADMIN INSTRUCTIONS
Status: DISCONTINUED | OUTPATIENT
Start: 2023-09-12 | End: 2023-09-12 | Stop reason: HOSPADM

## 2023-09-12 RX ORDER — HYDROCODONE BITARTRATE AND ACETAMINOPHEN 5; 325 MG/1; MG/1
1-2 TABLET ORAL EVERY 4 HOURS PRN
Qty: 20 TABLET | Refills: 0 | Status: SHIPPED | OUTPATIENT
Start: 2023-09-12 | End: 2024-01-25

## 2023-09-12 RX ORDER — LIDOCAINE HYDROCHLORIDE 20 MG/ML
INJECTION, SOLUTION INFILTRATION; PERINEURAL PRN
Status: DISCONTINUED | OUTPATIENT
Start: 2023-09-12 | End: 2023-09-12

## 2023-09-12 RX ORDER — ONDANSETRON 4 MG/1
4 TABLET, ORALLY DISINTEGRATING ORAL EVERY 30 MIN PRN
Status: DISCONTINUED | OUTPATIENT
Start: 2023-09-12 | End: 2023-09-12 | Stop reason: HOSPADM

## 2023-09-12 RX ORDER — HYDROMORPHONE HYDROCHLORIDE 1 MG/ML
0.5 INJECTION, SOLUTION INTRAMUSCULAR; INTRAVENOUS; SUBCUTANEOUS EVERY 5 MIN PRN
Status: DISCONTINUED | OUTPATIENT
Start: 2023-09-12 | End: 2023-09-12 | Stop reason: HOSPADM

## 2023-09-12 RX ORDER — HYDRALAZINE HYDROCHLORIDE 20 MG/ML
2.5-5 INJECTION INTRAMUSCULAR; INTRAVENOUS EVERY 10 MIN PRN
Status: DISCONTINUED | OUTPATIENT
Start: 2023-09-12 | End: 2023-09-12 | Stop reason: HOSPADM

## 2023-09-12 RX ORDER — DEXMEDETOMIDINE HYDROCHLORIDE 4 UG/ML
INJECTION, SOLUTION INTRAVENOUS PRN
Status: DISCONTINUED | OUTPATIENT
Start: 2023-09-12 | End: 2023-09-12

## 2023-09-12 RX ORDER — ONDANSETRON 2 MG/ML
INJECTION INTRAMUSCULAR; INTRAVENOUS PRN
Status: DISCONTINUED | OUTPATIENT
Start: 2023-09-12 | End: 2023-09-12

## 2023-09-12 RX ORDER — LABETALOL 20 MG/4 ML (5 MG/ML) INTRAVENOUS SYRINGE
10
Status: DISCONTINUED | OUTPATIENT
Start: 2023-09-12 | End: 2023-09-12 | Stop reason: HOSPADM

## 2023-09-12 RX ORDER — SODIUM CHLORIDE, SODIUM LACTATE, POTASSIUM CHLORIDE, CALCIUM CHLORIDE 600; 310; 30; 20 MG/100ML; MG/100ML; MG/100ML; MG/100ML
INJECTION, SOLUTION INTRAVENOUS CONTINUOUS
Status: DISCONTINUED | OUTPATIENT
Start: 2023-09-12 | End: 2023-09-12 | Stop reason: HOSPADM

## 2023-09-12 RX ORDER — NALOXONE HYDROCHLORIDE 0.4 MG/ML
0.4 INJECTION, SOLUTION INTRAMUSCULAR; INTRAVENOUS; SUBCUTANEOUS
Status: DISCONTINUED | OUTPATIENT
Start: 2023-09-12 | End: 2023-09-12 | Stop reason: HOSPADM

## 2023-09-12 RX ORDER — IBUPROFEN 600 MG/1
600 TABLET, FILM COATED ORAL EVERY 6 HOURS PRN
Qty: 30 TABLET | Refills: 0 | Status: SHIPPED | OUTPATIENT
Start: 2023-09-12 | End: 2024-01-25

## 2023-09-12 RX ORDER — PROPOFOL 10 MG/ML
INJECTION, EMULSION INTRAVENOUS PRN
Status: DISCONTINUED | OUTPATIENT
Start: 2023-09-12 | End: 2023-09-12

## 2023-09-12 RX ORDER — SENNA AND DOCUSATE SODIUM 50; 8.6 MG/1; MG/1
1 TABLET, FILM COATED ORAL 2 TIMES DAILY
Qty: 30 TABLET | Refills: 0 | Status: SHIPPED | OUTPATIENT
Start: 2023-09-12 | End: 2024-01-25

## 2023-09-12 RX ORDER — ONDANSETRON 2 MG/ML
4 INJECTION INTRAMUSCULAR; INTRAVENOUS EVERY 30 MIN PRN
Status: DISCONTINUED | OUTPATIENT
Start: 2023-09-12 | End: 2023-09-12 | Stop reason: HOSPADM

## 2023-09-12 RX ORDER — FENTANYL CITRATE 50 UG/ML
50 INJECTION, SOLUTION INTRAMUSCULAR; INTRAVENOUS EVERY 5 MIN PRN
Status: DISCONTINUED | OUTPATIENT
Start: 2023-09-12 | End: 2023-09-12 | Stop reason: HOSPADM

## 2023-09-12 RX ORDER — DEXAMETHASONE SODIUM PHOSPHATE 4 MG/ML
INJECTION, SOLUTION INTRA-ARTICULAR; INTRALESIONAL; INTRAMUSCULAR; INTRAVENOUS; SOFT TISSUE PRN
Status: DISCONTINUED | OUTPATIENT
Start: 2023-09-12 | End: 2023-09-12

## 2023-09-12 RX ORDER — LIDOCAINE 40 MG/G
CREAM TOPICAL
Status: DISCONTINUED | OUTPATIENT
Start: 2023-09-12 | End: 2023-09-12 | Stop reason: HOSPADM

## 2023-09-12 RX ORDER — GLYCOPYRROLATE 0.2 MG/ML
INJECTION, SOLUTION INTRAMUSCULAR; INTRAVENOUS PRN
Status: DISCONTINUED | OUTPATIENT
Start: 2023-09-12 | End: 2023-09-12

## 2023-09-12 RX ORDER — FENTANYL CITRATE 50 UG/ML
INJECTION, SOLUTION INTRAMUSCULAR; INTRAVENOUS PRN
Status: DISCONTINUED | OUTPATIENT
Start: 2023-09-12 | End: 2023-09-12

## 2023-09-12 RX ORDER — KETAMINE HYDROCHLORIDE 10 MG/ML
INJECTION INTRAMUSCULAR; INTRAVENOUS PRN
Status: DISCONTINUED | OUTPATIENT
Start: 2023-09-12 | End: 2023-09-12

## 2023-09-12 RX ADMIN — FENTANYL CITRATE 50 MCG: 50 INJECTION, SOLUTION INTRAMUSCULAR; INTRAVENOUS at 09:48

## 2023-09-12 RX ADMIN — PROPOFOL 100 MG: 10 INJECTION, EMULSION INTRAVENOUS at 09:33

## 2023-09-12 RX ADMIN — DEXAMETHASONE SODIUM PHOSPHATE 10 MG: 4 INJECTION, SOLUTION INTRA-ARTICULAR; INTRALESIONAL; INTRAMUSCULAR; INTRAVENOUS; SOFT TISSUE at 09:44

## 2023-09-12 RX ADMIN — SODIUM CHLORIDE, POTASSIUM CHLORIDE, SODIUM LACTATE AND CALCIUM CHLORIDE: 600; 310; 30; 20 INJECTION, SOLUTION INTRAVENOUS at 07:44

## 2023-09-12 RX ADMIN — Medication 2 G: at 08:52

## 2023-09-12 RX ADMIN — DEXMEDETOMIDINE 10 MCG: 100 INJECTION, SOLUTION, CONCENTRATE INTRAVENOUS at 09:44

## 2023-09-12 RX ADMIN — ONDANSETRON 4 MG: 2 INJECTION INTRAMUSCULAR; INTRAVENOUS at 09:44

## 2023-09-12 RX ADMIN — MIDAZOLAM 2 MG: 1 INJECTION INTRAMUSCULAR; INTRAVENOUS at 07:46

## 2023-09-12 RX ADMIN — FENTANYL CITRATE 50 MCG: 50 INJECTION, SOLUTION INTRAMUSCULAR; INTRAVENOUS at 09:26

## 2023-09-12 RX ADMIN — PROPOFOL 200 MG: 10 INJECTION, EMULSION INTRAVENOUS at 09:26

## 2023-09-12 RX ADMIN — MIDAZOLAM 2 MG: 1 INJECTION INTRAMUSCULAR; INTRAVENOUS at 09:22

## 2023-09-12 RX ADMIN — ROCURONIUM BROMIDE 20 MG: 10 INJECTION INTRAVENOUS at 09:35

## 2023-09-12 RX ADMIN — LIDOCAINE HYDROCHLORIDE 100 MG: 20 INJECTION, SOLUTION INFILTRATION; PERINEURAL at 09:26

## 2023-09-12 RX ADMIN — GLYCOPYRROLATE 0.2 MG: 0.2 INJECTION, SOLUTION INTRAMUSCULAR; INTRAVENOUS at 09:55

## 2023-09-12 RX ADMIN — Medication 160 MG: at 09:26

## 2023-09-12 RX ADMIN — Medication 25 MG: at 09:26

## 2023-09-12 RX ADMIN — SUGAMMADEX 200 MG: 100 INJECTION, SOLUTION INTRAVENOUS at 10:05

## 2023-09-12 RX ADMIN — ROCURONIUM BROMIDE 10 MG: 10 INJECTION INTRAVENOUS at 09:26

## 2023-09-12 RX ADMIN — DEXMEDETOMIDINE 10 MCG: 100 INJECTION, SOLUTION, CONCENTRATE INTRAVENOUS at 09:48

## 2023-09-12 RX ADMIN — OXYCODONE HYDROCHLORIDE 5 MG: 5 TABLET ORAL at 11:05

## 2023-09-12 ASSESSMENT — ACTIVITIES OF DAILY LIVING (ADL)
ADLS_ACUITY_SCORE: 33
ADLS_ACUITY_SCORE: 35
ADLS_ACUITY_SCORE: 35

## 2023-09-12 ASSESSMENT — LIFESTYLE VARIABLES: TOBACCO_USE: 1

## 2023-09-12 NOTE — INTERVAL H&P NOTE
"I have reviewed the surgical (or preoperative) H&P that is linked to this encounter, and examined the patient. There are no significant changes    Clinical Conditions Present on Arrival:  Clinically Significant Risk Factors Present on Admission                  # Overweight: Estimated body mass index is 29.03 kg/m  as calculated from the following:    Height as of 8/17/23: 1.854 m (6' 1\").    Weight as of 8/17/23: 99.8 kg (220 lb).       "

## 2023-09-12 NOTE — OP NOTE
"[facility]   Operative Note    Pre-operative diagnosis: Chronic recurrent pilonidal cyst [L05.91]   Post-operative diagnosis Same    Procedure: Procedure(s):  Pilonidal cyst debridement   Surgeon(s): Surgeon(s) and Role:     * Chan Campbell MD - Primary   Estimated blood loss: 10 mL    Specimens: * No specimens in log *   Findings: There were two pits at midline that was found to have tuft of hair, small chronic area off midline.       Description of procedure:   After confirming consent in same day surgery the patient was brought to the operating room placed supine on the table and general anesthesia was administered. Then the patient was placed in the prone mohsen-knife position ensuring all pressure points were padded. The gluteal cleft was prepped and draped in standard fashion. Inspection showed the above findings. Using a fistula probe the pits at midline were probed and opened up tracts with cautery. The choric area was opened an included in the wound. This was debrided with curette aprox 3 x 1 x 1 cm. There was noted to be a tuft of hair in tract. Hemostasis was achieved, the edges were tacked down with 2-0 vicryl interrupted sutures x 3 and the wound was packed open with 1/2\" packing. He tolerated the procedure well without apparent complication and minimal blood loss.     Chan Campbell MD    Lay Kennedy actively first assisted during this operation providing necessary retraction and exposure as well as maintaining hemostasis and a clear operative field. This was helpful in allowing the operation to proceed in a safe and expeditious manner. She was present for the entire duration of the operation.         "

## 2023-09-12 NOTE — OR NURSING
PACU Respiratory Event Documentation     1) Episodes of Apnea greater than or equal to 10 seconds: 0    2) Bradypnea - less than 8 breaths per minute: 0    3) Pain score on 0 to 10 scale: 4    4) Pain-sedation mismatch (yes or no): NO    5) Repeated 02 desaturation less than 90% (yes or no): No    Anesthesia notified? (yes or no): NA    Any of the above events occuring repeatedly in separate 30 minute intervals may be considered recurrent PACU respiratory events.

## 2023-09-12 NOTE — ANESTHESIA POSTPROCEDURE EVALUATION
Patient: Anton Donovan    Procedure: Procedure(s):  Pilonidal cyst debridement       Anesthesia Type:  General    Note:  Disposition: Outpatient   Postop Pain Control: Uneventful            Sign Out: Well controlled pain   PONV: No   Neuro/Psych: Uneventful            Sign Out: Acceptable/Baseline neuro status   Airway/Respiratory: Uneventful            Sign Out: Acceptable/Baseline resp. status   CV/Hemodynamics: Uneventful            Sign Out: Acceptable CV status; No obvious hypovolemia; No obvious fluid overload   Other NRE: NONE   DID A NON-ROUTINE EVENT OCCUR? No       Last vitals:  Vitals Value Taken Time   /96 09/12/23 1040   Temp 97.9  F (36.6  C) 09/12/23 1040   Pulse 75 09/12/23 1041   Resp 9 09/12/23 1041   SpO2 94 % 09/12/23 1041   Vitals shown include unvalidated device data.    Electronically Signed By: ROSANNA Gutiérrez CRNA  September 12, 2023  2:32 PM

## 2023-09-12 NOTE — BRIEF OP NOTE
[facility]    Brief Operative Note    Pre-operative diagnosis: Chronic recurrent pilonidal cyst [L05.91]  Post-operative diagnosis Same as pre-operative diagnosis    Procedure: Procedure(s):  Pilonidal cyst debridement  Surgeon: Surgeon(s) and Role:     * Chan Campbell MD - Primary  Anesthesia: Choice   Estimated Blood Loss: Less than 10 ml    Drains: None  Specimens: * No specimens in log *  Findings:   There were two pits and a chornic wound off midline, opened up and debrided aprox 3 cm x 1 cm x 1 cm and packed open.  Complications: None.  Implants: * No implants in log *

## 2023-09-12 NOTE — LETTER
[facility] PREOP/PHASE [facility]  [address] [address]  Phone: [phone]    September 12, 2023        Anton Donovan  [id number] [address] [address]          To whom it may concern:    RE: Anton Donovan    Patient was seen and treated today in same day surgery.  Patient may return to work on Sept. 22, 2023.    Please contact me for questions or concerns.      Sincerely,    Dr. Campbell/TIAN Jamison

## 2023-09-12 NOTE — OR NURSING
Patient and responsible adult given discharge instructions with no questions regarding instructions. Tolerated cookies, juice, and coffee. Dressing change supplies and instructions given. Imelda score 18. Pain level 4/10.  Discharged from unit via ambulation. Patient discharged to home with friend.

## 2023-09-12 NOTE — ANESTHESIA PROCEDURE NOTES
Airway       Patient location during procedure: OR       Procedure Start/Stop Times: 9/12/2023 9:30 AM  Staff -        CRNA: Talisha Carpenter APRN CRNA       Performed By: CRNA  Consent for Airway        Urgency: elective  Indications and Patient Condition       Indications for airway management: nanette-procedural and airway protection       Induction type:intravenous       Mask difficulty assessment: 1 - vent by mask    Final Airway Details       Final airway type: endotracheal airway       Successful airway: ETT - single  Endotracheal Airway Details        ETT size (mm): 7.5       Cuffed: yes       Cuff volume (mL): 6       Successful intubation technique: direct laryngoscopy and video laryngoscopy       DL Blade Type: Thurston 2       VL Blade Size: Glidescope 4       Grade View of Cords: 2 (with DL, grade 2b view; with VL grade 1)       Adjucts: stylet       Position: Right       Measured from: gums/teeth       Secured at (cm): 23       Bite block used: None    Post intubation assessment        Placement verified by: capnometry, equal breath sounds and chest rise        Number of attempts at approach: 1       Number of other approaches attempted: 1       Secured with: plastic tape       Ease of procedure: easy       Dentition: Intact and Unchanged    Medication(s) Administered   Medication Administration Time: 9/12/2023 9:30 AM    Additional Comments       Teeth in poor repair at baseline; dental caries at gum line; unchanged

## 2023-09-12 NOTE — ANESTHESIA CARE TRANSFER NOTE
Patient: Anton Donovan    Procedure: Procedure(s):  Pilonidal cyst debridement       Diagnosis: Chronic recurrent pilonidal cyst [L05.91]  Diagnosis Additional Information: No value filed.    Anesthesia Type:   General     Note:    Oropharynx: oropharynx clear of all foreign objects and spontaneously breathing  Level of Consciousness: drowsy  Oxygen Supplementation: nasal cannula  Level of Supplemental Oxygen (L/min / FiO2): 2  Independent Airway: airway patency satisfactory and stable  Dentition: dentition unchanged  Vital Signs Stable: post-procedure vital signs reviewed and stable  Report to RN Given: handoff report given  Patient transferred to: PACU    Handoff Report: Identifed the Patient, Identified the Reponsible Provider, Reviewed the pertinent medical history, Discussed the surgical course, Reviewed Intra-OP anesthesia mangement and issues during anesthesia, Set expectations for post-procedure period and Allowed opportunity for questions and acknowledgement of understanding    Vitals:  Vitals Value Taken Time   /104 09/12/23 1012   Temp     Pulse 100 09/12/23 1014   Resp 13 09/12/23 1014   SpO2 97 % 09/12/23 1014   Vitals shown include unvalidated device data.    Electronically Signed By: ROSANNA ALMEIDA CRNA  September 12, 2023  10:15 AM

## 2023-09-14 PROBLEM — M79.10 MUSCLE PAIN: Status: ACTIVE | Noted: 2023-09-14

## 2023-09-14 PROBLEM — R76.11 POSITIVE REACTION TO TUBERCULIN SKIN TEST: Status: ACTIVE | Noted: 2023-09-14

## 2023-09-14 PROBLEM — G47.30 SLEEP APNEA: Status: ACTIVE | Noted: 2023-09-14
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
## 2023-09-14 PROBLEM — K04.7 PERIAPICAL ABSCESS WITHOUT SINUS: Status: ACTIVE | Noted: 2023-09-14

## 2023-09-14 PROBLEM — M99.09 SEGMENTAL DYSFUNCTION: Status: ACTIVE | Noted: 2023-09-14

## 2023-09-14 PROBLEM — R00.1 SINUS BRADYCARDIA: Status: ACTIVE | Noted: 2023-09-14

## 2023-09-14 PROBLEM — F29 PSYCHOSIS (H): Status: ACTIVE | Noted: 2023-09-14

## 2023-09-14 PROBLEM — R07.0 THROAT PAIN: Status: ACTIVE | Noted: 2023-09-14

## 2023-09-14 PROBLEM — F10.20 ALCOHOL DEPENDENCE, EPISODIC (H): Status: ACTIVE | Noted: 2023-09-14

## 2023-09-14 PROBLEM — R05.9 COUGH: Status: ACTIVE | Noted: 2023-09-14

## 2023-09-14 PROBLEM — L05.91 PILONIDAL CYST WITHOUT ABSCESS: Status: ACTIVE | Noted: 2023-09-14

## 2023-09-14 PROBLEM — H90.5 SENSORY HEARING LOSS, UNILATERAL: Status: ACTIVE | Noted: 2023-09-14

## 2023-09-14 PROBLEM — J34.89 NASAL DISCHARGE: Status: ACTIVE | Noted: 2023-09-14

## 2023-09-14 PROBLEM — G47.30 BREATHING-RELATED SLEEP DISORDER: Status: ACTIVE | Noted: 2023-09-14

## 2023-09-14 PROBLEM — M79.18 MYOFASCIAL PAIN SYNDROME: Status: ACTIVE | Noted: 2023-09-14

## 2023-09-14 PROBLEM — H57.89 RED EYES: Status: ACTIVE | Noted: 2023-09-14

## 2023-09-14 PROBLEM — F31.2 BIPOLAR AFFECTIVE DISORDER, CURRENTLY MANIC, SEVERE, WITH PSYCHOTIC FEATURES (H): Status: ACTIVE | Noted: 2023-09-14

## 2023-09-14 PROBLEM — F31.10 BIPOLAR DISORDER, MOST RECENT EPISODE MANIC (H): Status: ACTIVE | Noted: 2023-09-14

## 2023-09-14 PROBLEM — F43.10 POST-TRAUMATIC STRESS DISORDER: Status: ACTIVE | Noted: 2023-09-14

## 2023-09-14 PROBLEM — Z56.0 UNEMPLOYED: Status: ACTIVE | Noted: 2023-09-14

## 2023-09-14 PROBLEM — H93.19 TINNITUS: Status: ACTIVE | Noted: 2023-09-14

## 2023-09-14 PROBLEM — F19.10 SUBSTANCE ABUSE (H): Status: ACTIVE | Noted: 2023-09-14

## 2023-09-14 PROBLEM — T14.8XXA BLISTER: Status: ACTIVE | Noted: 2023-09-14

## 2023-09-14 PROBLEM — Z91.199 NONCOMPLIANCE WITH TREATMENT REGIMEN: Status: ACTIVE | Noted: 2023-09-14

## 2023-09-14 PROBLEM — R00.2 PALPITATIONS: Status: ACTIVE | Noted: 2023-09-14

## 2023-09-14 PROBLEM — R41.82 ALTERED MENTAL STATUS, UNSPECIFIED: Status: ACTIVE | Noted: 2023-09-14

## 2023-09-14 PROBLEM — F41.9 ANXIETY DISORDER: Status: ACTIVE | Noted: 2023-09-14

## 2023-09-14 PROBLEM — F31.74 BIPOLAR DISORDER, IN FULL REMISSION, MOST RECENT EPISODE MANIC (H): Status: ACTIVE | Noted: 2023-09-14

## 2023-09-14 PROBLEM — F12.11 NONDEPENDENT CANNABIS ABUSE IN REMISSION: Status: ACTIVE | Noted: 2023-09-14

## 2023-09-28 ENCOUNTER — OFFICE VISIT (OUTPATIENT)
Dept: SURGERY | Facility: OTHER | Age: 33
End: 2023-09-28
Attending: CLINICAL NURSE SPECIALIST
Payer: OTHER GOVERNMENT

## 2023-09-28 VITALS
WEIGHT: 243 LBS | BODY MASS INDEX: 32.2 KG/M2 | HEART RATE: 86 BPM | HEIGHT: 73 IN | OXYGEN SATURATION: 98 % | DIASTOLIC BLOOD PRESSURE: 74 MMHG | RESPIRATION RATE: 14 BRPM | SYSTOLIC BLOOD PRESSURE: 120 MMHG

## 2023-09-28 DIAGNOSIS — Z48.00 CHANGE OF DRESSING: ICD-10-CM

## 2023-09-28 DIAGNOSIS — Z98.890 POSTOPERATIVE STATE: Primary | ICD-10-CM

## 2023-09-28 DIAGNOSIS — Z98.890 S/P SURGICAL REMOVAL OF PILONIDAL CYST: ICD-10-CM

## 2023-09-28 PROCEDURE — 99212 OFFICE O/P EST SF 10 MIN: CPT | Performed by: CLINICAL NURSE SPECIALIST

## 2023-09-28 ASSESSMENT — PAIN SCALES - GENERAL: PAINLEVEL: SEVERE PAIN (6)

## 2023-09-29 NOTE — PROGRESS NOTES
"S:  Anton returns 2 weeks after pilonidal cyst debridement by Dr. Campbell for chronic, recurrent pilonidal cyst.. Doing well post operatively, tolerating a general diet, having regular bowel movements, passing flatus, no diarrhea, taking tylenol and ibuprofen for pain with good control.  His ex-wife as been assisting with the dressing changes.  Specifically denies fevers, chills, nausea, vomiting, shortness of breath.     O:  /74 (BP Location: Right arm, Cuff Size: Adult Large)   Pulse 86   Resp 14   Ht 1.854 m (6' 1\")   Wt 110.2 kg (243 lb)   SpO2 98%   BMI 32.06 kg/m    G: alert oriented, no acute distress   ENT: sclera non-icteric   Pulm: no respiratory distress   CVS: RRR  ABD:  Soft, ND/NT no rebound, no guarding  Wound description:     Type of Wound:  surgical   Location:  gluteal cleft    Drainage amount:  moderate   Drainage color:  serosanguinous   Odor:  none   Wound bed:  pink   Depth:  full thickness   Surrounding skin:  intact        Measurements:  3 x 1 x 0.5 cm   Pain:  tender - 6/10   Wound debridement completed on: 9/28/2023, debridement type: Mechanical        Dressing change:      Wound cleansed with mild soap, rinse, dried.  Conservative debridement performed with dry gauze to remove possible bioburden (3 sq cm) into the level of the dermis. Patient was informed of the risks and benefits and consented to the procedure.  Dressed with 1/2\" plain packing strip, covered with dry 4x4 gauze, secured with medipore tape.    Ext: WWP      A/P  #1 S/P Pilonidal cyst debridement     The wound bed is clean, with no signs/symptoms of infection.  They are doing a great job with the packing.  He will start removing the dressing, showering, and then re-packing the wound.    Follow up in 2 weeks or sooner for acute concerns.      Keke Kennedy CNS  Surgery and Wound Care  [place]    "

## 2024-01-25 ENCOUNTER — APPOINTMENT (OUTPATIENT)
Dept: GENERAL RADIOLOGY | Facility: HOSPITAL | Age: 34
End: 2024-01-25
Attending: EMERGENCY MEDICINE
Payer: OTHER GOVERNMENT

## 2024-01-25 ENCOUNTER — APPOINTMENT (OUTPATIENT)
Dept: CT IMAGING | Facility: HOSPITAL | Age: 34
End: 2024-01-25
Attending: EMERGENCY MEDICINE
Payer: OTHER GOVERNMENT

## 2024-01-25 ENCOUNTER — HOSPITAL ENCOUNTER (EMERGENCY)
Facility: HOSPITAL | Age: 34
Discharge: ANOTHER HOSPITAL | End: 2024-01-25
Attending: EMERGENCY MEDICINE | Admitting: EMERGENCY MEDICINE
Payer: OTHER GOVERNMENT

## 2024-01-25 VITALS
OXYGEN SATURATION: 93 % | HEART RATE: 81 BPM | HEIGHT: 73 IN | RESPIRATION RATE: 18 BRPM | SYSTOLIC BLOOD PRESSURE: 143 MMHG | TEMPERATURE: 98 F | BODY MASS INDEX: 32.06 KG/M2 | DIASTOLIC BLOOD PRESSURE: 98 MMHG

## 2024-01-25 DIAGNOSIS — S93.05XA ANKLE DISLOCATION, LEFT, INITIAL ENCOUNTER: ICD-10-CM

## 2024-01-25 DIAGNOSIS — S82.452A CLOSED DISPLACED COMMINUTED FRACTURE OF SHAFT OF LEFT FIBULA, INITIAL ENCOUNTER: ICD-10-CM

## 2024-01-25 DIAGNOSIS — S82.52XC: ICD-10-CM

## 2024-01-25 LAB
ALBUMIN SERPL BCG-MCNC: 4.4 G/DL (ref 3.5–5.2)
ALP SERPL-CCNC: 54 U/L (ref 40–150)
ALT SERPL W P-5'-P-CCNC: 148 U/L (ref 0–70)
ANION GAP SERPL CALCULATED.3IONS-SCNC: 17 MMOL/L (ref 7–15)
AST SERPL W P-5'-P-CCNC: 107 U/L (ref 0–45)
BASOPHILS # BLD AUTO: 0.1 10E3/UL (ref 0–0.2)
BASOPHILS NFR BLD AUTO: 1 %
BILIRUB SERPL-MCNC: 0.6 MG/DL
BUN SERPL-MCNC: 15.4 MG/DL (ref 6–20)
CALCIUM SERPL-MCNC: 8.9 MG/DL (ref 8.6–10)
CHLORIDE SERPL-SCNC: 99 MMOL/L (ref 98–107)
CREAT SERPL-MCNC: 0.9 MG/DL (ref 0.67–1.17)
DEPRECATED HCO3 PLAS-SCNC: 20 MMOL/L (ref 22–29)
EGFRCR SERPLBLD CKD-EPI 2021: >90 ML/MIN/1.73M2
EOSINOPHIL # BLD AUTO: 0.3 10E3/UL (ref 0–0.7)
EOSINOPHIL NFR BLD AUTO: 4 %
ERYTHROCYTE [DISTWIDTH] IN BLOOD BY AUTOMATED COUNT: 11.7 % (ref 10–15)
GLUCOSE SERPL-MCNC: 139 MG/DL (ref 70–99)
HCT VFR BLD AUTO: 42.6 % (ref 40–53)
HGB BLD-MCNC: 15.5 G/DL (ref 13.3–17.7)
HOLD SPECIMEN: NORMAL
IMM GRANULOCYTES # BLD: 0.1 10E3/UL
IMM GRANULOCYTES NFR BLD: 1 %
INR PPP: 1.05 (ref 0.85–1.15)
LYMPHOCYTES # BLD AUTO: 1.9 10E3/UL (ref 0.8–5.3)
LYMPHOCYTES NFR BLD AUTO: 26 %
MCH RBC QN AUTO: 35 PG (ref 26.5–33)
MCHC RBC AUTO-ENTMCNC: 36.4 G/DL (ref 31.5–36.5)
MCV RBC AUTO: 96 FL (ref 78–100)
MONOCYTES # BLD AUTO: 0.8 10E3/UL (ref 0–1.3)
MONOCYTES NFR BLD AUTO: 10 %
NEUTROPHILS # BLD AUTO: 4.3 10E3/UL (ref 1.6–8.3)
NEUTROPHILS NFR BLD AUTO: 58 %
NRBC # BLD AUTO: 0 10E3/UL
NRBC BLD AUTO-RTO: 0 /100
PLATELET # BLD AUTO: 181 10E3/UL (ref 150–450)
POTASSIUM SERPL-SCNC: 3.5 MMOL/L (ref 3.4–5.3)
PROT SERPL-MCNC: 7 G/DL (ref 6.4–8.3)
RBC # BLD AUTO: 4.43 10E6/UL (ref 4.4–5.9)
SODIUM SERPL-SCNC: 136 MMOL/L (ref 135–145)
WBC # BLD AUTO: 7.4 10E3/UL (ref 4–11)

## 2024-01-25 PROCEDURE — 99152 MOD SED SAME PHYS/QHP 5/>YRS: CPT

## 2024-01-25 PROCEDURE — 99152 MOD SED SAME PHYS/QHP 5/>YRS: CPT | Performed by: EMERGENCY MEDICINE

## 2024-01-25 PROCEDURE — 80053 COMPREHEN METABOLIC PANEL: CPT | Performed by: EMERGENCY MEDICINE

## 2024-01-25 PROCEDURE — 96376 TX/PRO/DX INJ SAME DRUG ADON: CPT | Mod: XU

## 2024-01-25 PROCEDURE — 73600 X-RAY EXAM OF ANKLE: CPT | Mod: LT

## 2024-01-25 PROCEDURE — 36415 COLL VENOUS BLD VENIPUNCTURE: CPT | Performed by: EMERGENCY MEDICINE

## 2024-01-25 PROCEDURE — 73700 CT LOWER EXTREMITY W/O DYE: CPT | Mod: LT

## 2024-01-25 PROCEDURE — 99284 EMERGENCY DEPT VISIT MOD MDM: CPT | Mod: 57 | Performed by: EMERGENCY MEDICINE

## 2024-01-25 PROCEDURE — 96374 THER/PROPH/DIAG INJ IV PUSH: CPT | Mod: XU

## 2024-01-25 PROCEDURE — 250N000011 HC RX IP 250 OP 636

## 2024-01-25 PROCEDURE — 999N000065 XR ANKLE PORT LEFT 2 VIEWS: Mod: LT

## 2024-01-25 PROCEDURE — 250N000009 HC RX 250: Performed by: EMERGENCY MEDICINE

## 2024-01-25 PROCEDURE — 27818 TREATMENT OF ANKLE FRACTURE: CPT | Mod: 54 | Performed by: EMERGENCY MEDICINE

## 2024-01-25 PROCEDURE — 85610 PROTHROMBIN TIME: CPT | Performed by: EMERGENCY MEDICINE

## 2024-01-25 PROCEDURE — 85025 COMPLETE CBC W/AUTO DIFF WBC: CPT | Performed by: EMERGENCY MEDICINE

## 2024-01-25 PROCEDURE — 250N000011 HC RX IP 250 OP 636: Mod: JZ | Performed by: EMERGENCY MEDICINE

## 2024-01-25 PROCEDURE — 258N000003 HC RX IP 258 OP 636

## 2024-01-25 PROCEDURE — 27818 TREATMENT OF ANKLE FRACTURE: CPT | Mod: LT

## 2024-01-25 PROCEDURE — 99285 EMERGENCY DEPT VISIT HI MDM: CPT | Mod: 25

## 2024-01-25 PROCEDURE — 96375 TX/PRO/DX INJ NEW DRUG ADDON: CPT | Mod: XU

## 2024-01-25 PROCEDURE — 258N000003 HC RX IP 258 OP 636: Mod: JZ | Performed by: EMERGENCY MEDICINE

## 2024-01-25 RX ORDER — FENTANYL CITRATE 50 UG/ML
100 INJECTION, SOLUTION INTRAMUSCULAR; INTRAVENOUS ONCE
Status: COMPLETED | OUTPATIENT
Start: 2024-01-25 | End: 2024-01-25

## 2024-01-25 RX ORDER — CEFAZOLIN SODIUM/WATER 2 G/20 ML
2 SYRINGE (ML) INTRAVENOUS EVERY 8 HOURS
Status: DISCONTINUED | OUTPATIENT
Start: 2024-01-25 | End: 2024-01-25

## 2024-01-25 RX ORDER — SODIUM CHLORIDE 9 MG/ML
INJECTION, SOLUTION INTRAVENOUS
Status: COMPLETED
Start: 2024-01-25 | End: 2024-01-25

## 2024-01-25 RX ORDER — KETOROLAC TROMETHAMINE 15 MG/ML
15 INJECTION, SOLUTION INTRAMUSCULAR; INTRAVENOUS ONCE
Status: COMPLETED | OUTPATIENT
Start: 2024-01-25 | End: 2024-01-25

## 2024-01-25 RX ORDER — CEFAZOLIN SODIUM 2 G/100ML
2 INJECTION, SOLUTION INTRAVENOUS ONCE
Status: DISCONTINUED | OUTPATIENT
Start: 2024-01-25 | End: 2024-01-25

## 2024-01-25 RX ORDER — CEFAZOLIN SODIUM/WATER 2 G/20 ML
SYRINGE (ML) INTRAVENOUS
Status: COMPLETED
Start: 2024-01-25 | End: 2024-01-25

## 2024-01-25 RX ORDER — CEFAZOLIN SODIUM/WATER 2 G/20 ML
2 SYRINGE (ML) INTRAVENOUS ONCE
Status: COMPLETED | OUTPATIENT
Start: 2024-01-25 | End: 2024-01-25

## 2024-01-25 RX ORDER — OLANZAPINE 10 MG/1
TABLET ORAL
COMMUNITY
Start: 2023-12-05 | End: 2024-01-25

## 2024-01-25 RX ORDER — METFORMIN HCL 500 MG
500 TABLET, EXTENDED RELEASE 24 HR ORAL
COMMUNITY
Start: 2023-12-22 | End: 2024-01-25

## 2024-01-25 RX ADMIN — Medication 2 G: at 03:47

## 2024-01-25 RX ADMIN — SODIUM CHLORIDE 100 ML: 9 INJECTION, SOLUTION INTRAVENOUS at 05:58

## 2024-01-25 RX ADMIN — KETOROLAC TROMETHAMINE 15 MG: 15 INJECTION, SOLUTION INTRAMUSCULAR; INTRAVENOUS at 05:14

## 2024-01-25 RX ADMIN — Medication 28 MG: at 05:51

## 2024-01-25 RX ADMIN — Medication 110 MG: at 02:31

## 2024-01-25 RX ADMIN — HYDROMORPHONE HYDROCHLORIDE 1 MG: 1 INJECTION, SOLUTION INTRAMUSCULAR; INTRAVENOUS; SUBCUTANEOUS at 03:09

## 2024-01-25 RX ADMIN — HYDROMORPHONE HYDROCHLORIDE 1 MG: 1 INJECTION, SOLUTION INTRAMUSCULAR; INTRAVENOUS; SUBCUTANEOUS at 04:23

## 2024-01-25 RX ADMIN — FENTANYL CITRATE 100 MCG: 50 INJECTION, SOLUTION INTRAMUSCULAR; INTRAVENOUS at 02:08

## 2024-01-25 ASSESSMENT — ENCOUNTER SYMPTOMS
CHILLS: 0
FEVER: 0
SHORTNESS OF BREATH: 0

## 2024-01-25 ASSESSMENT — ACTIVITIES OF DAILY LIVING (ADL)
ADLS_ACUITY_SCORE: 35

## 2024-01-25 NOTE — ED NOTES
Report called to Symone at  [facility] [place] in [facility]  Report also given to Katelyn OVERTON

## 2024-01-25 NOTE — ED NOTES
[facility] here and report given to crew. Patient's friend, Evangelina, at bedside. Evangelina took patient's belongings home. Patient left with hat, cell phone and pants.

## 2024-01-25 NOTE — ED TRIAGE NOTES
[place] via [facility] [place] [facility] Patient states that he was leaving and slipped while walking down an incline. EMS placed splint, established IV and gave 50 mcg fentanyl and 1mg of versed prior to arrival.

## 2024-01-25 NOTE — ED NOTES
[facility] Stat Doc called and patient to go ED to ED when work up complete.  Nurse to Nurse [phone].

## 2024-01-25 NOTE — ED PROVIDER NOTES
History     Chief Complaint   Patient presents with    Trauma     Left ankle, slipped on ice from standing, deformity noted, splinted by EMS     HPI  Anton Donovan is a 33 year old male who is here after a fall.  Was at a gas station.  While walking out, left foot landed summer twisted underneath itself while patient spine.  911 was called.  They put patient in a splint and brought him here.  Patient states he has the worst pain of his life and his left ankle.  Has sensation.  Unable to move it.  Has not had anything to eat or drink since earlier yesterday morning.    Allergies:  Allergies   Allergen Reactions    Penicillin G     Shellfish Allergy     Shellfish-Derived Products     Shrimp Other (See Comments)    Penicillins Unknown, Rash and Dermatitis       Problem List:    Patient Active Problem List    Diagnosis Date Noted    Altered mental status, unspecified 09/14/2023     Priority: Medium    Bipolar affective disorder, currently manic, severe, with psychotic features (H) 09/14/2023     Priority: Medium    Bipolar disorder, most recent episode manic (H) 09/14/2023     Priority: Medium    Bipolar disorder, in full remission, most recent episode manic (H24) 09/14/2023     Priority: Medium    Blister 09/14/2023     Priority: Medium    Breathing-related sleep disorder 09/14/2023     Priority: Medium    Sleep apnea 09/14/2023     Priority: Medium     Jan 07, 2022 Entered By: EDWIN BOLANOS Comment: CPAP      Cough 09/14/2023     Priority: Medium    Nasal discharge 09/14/2023     Priority: Medium    Noncompliance with treatment regimen 09/14/2023     Priority: Medium    Nondependent cannabis abuse in remission 09/14/2023     Priority: Medium    Palpitations 09/14/2023     Priority: Medium    Periapical abscess without sinus 09/14/2023     Priority: Medium    Pilonidal cyst without abscess 09/14/2023     Priority: Medium    Positive reaction to tuberculin skin test 09/14/2023     Priority: Medium    Red eyes  09/14/2023     Priority: Medium    Muscle pain 09/14/2023     Priority: Medium    Myofascial pain syndrome 09/14/2023     Priority: Medium    Segmental dysfunction 09/14/2023     Priority: Medium    Sensory hearing loss, unilateral 09/14/2023     Priority: Medium    Sinus bradycardia 09/14/2023     Priority: Medium    Tinnitus 09/14/2023     Priority: Medium    Substance abuse (H) 09/14/2023     Priority: Medium     Jun 25, 2014 Entered By: SHAD ANDERSON Comment: in early remission      Throat pain 09/14/2023     Priority: Medium    Unemployed 09/14/2023     Priority: Medium    Alcohol dependence, episodic (H) 09/14/2023     Priority: Medium    Anxiety disorder 09/14/2023     Priority: Medium    Bitten or stung by nonvenomous insect and other nonvenomous arthropods, initial encounter 09/14/2023     Priority: Medium    Post-traumatic stress disorder 09/14/2023     Priority: Medium    Psychosis (H) 09/14/2023     Priority: Medium    Alcohol abuse 08/17/2023     Priority: Medium    Alcohol dependence (H) 08/17/2023     Priority: Medium    Chronic low back pain 08/04/2022     Priority: Medium    Idiopathic peripheral neuropathy 08/04/2022     Priority: Medium    Chronic pain 08/04/2022     Priority: Medium    Deferred diagnosis on axis I 08/04/2022     Priority: Medium    Encounter for occupational therapy 08/04/2022     Priority: Medium    Finding of above normal blood pressure 08/04/2022     Priority: Medium    Major depressive disorder 08/04/2022     Priority: Medium    Paranoia (H) 08/03/2022     Priority: Medium    Alcohol dependence in remission (H) 07/26/2022     Priority: Medium    PTSD (post-traumatic stress disorder) 05/31/2022     Priority: Medium    Anal fistula 08/24/2021     Priority: Medium     Added automatically from request for surgery [id number]          Past Medical History:    Past Medical History:   Diagnosis Date    Alcohol dependence (H)     Bipolar I disorder, most recent episode (or current) manic  "(H)     Chronic low back pain     Hereditary and idiopathic peripheral neuropathy     PTSD (post-traumatic stress disorder)     Sleep apnea     Tobacco use        Past Surgical History:    Past Surgical History:   Procedure Laterality Date    CYSTECTOMY PILONIDAL N/A 9/12/2023    Procedure: Pilonidal cyst debridement;  Surgeon: Chan Campbell MD;  Location: [facility]    EXAM UNDER ANESTHESIA RECTUM N/A 9/7/2021    Procedure: EXAM UNDER ANESTHESIA, I&D of Abssess;  Surgeon: Kale Cruz MD;  Location: [facility] OR       Family History:    Family History   Problem Relation Age of Onset    Bladder Cancer Mother     Throat cancer Father        Social History:  Marital Status:  Legally  [3]  Social History     Tobacco Use    Smoking status: Every Day     Packs/day: 1     Types: Cigarettes     Start date: 9/20/2018    Smokeless tobacco: Former     Quit date: 9/20/2018    Tobacco comments:     declines quitplan referral 9/21/2021   Substance Use Topics    Alcohol use: Yes     Comment: few times a week    Drug use: Yes     Types: Marijuana     Comment: few times a week        Medications:    OLANZapine (ZYPREXA) 20 MG tablet          Review of Systems   Constitutional:  Negative for chills and fever.   Respiratory:  Negative for shortness of breath.    Cardiovascular:  Negative for chest pain.   All other systems reviewed and are negative.      Physical Exam   BP: 126/93  Pulse: 61  Temp: 98.3  F (36.8  C)  Resp: 18  Height: 185.4 cm (6' 1\")  SpO2: 94 %      Physical Exam  Constitutional:       General: He is not in acute distress.     Appearance: Normal appearance.   HENT:      Head: Normocephalic and atraumatic.      Right Ear: External ear normal.      Left Ear: External ear normal.      Nose: Nose normal. No rhinorrhea.   Eyes:      Conjunctiva/sclera: Conjunctivae normal.   Cardiovascular:      Rate and Rhythm: Normal rate and regular rhythm.      Pulses: Normal pulses.   Pulmonary:      Effort: Pulmonary " effort is normal. No respiratory distress.      Breath sounds: Normal breath sounds.   Abdominal:      General: There is no distension.      Palpations: Abdomen is soft.      Tenderness: There is no abdominal tenderness.   Musculoskeletal:         General: Swelling, tenderness, deformity and signs of injury present.      Comments: L foot externally rotated compared to tib/fib, 2+ dorsalis pedis pulse, open fx / abrasion to medial malleolus over bony prominence   Skin:     General: Skin is warm and dry.      Capillary Refill: Capillary refill takes less than 2 seconds.   Neurological:      General: No focal deficit present.      Mental Status: He is alert. Mental status is at baseline.   Psychiatric:         Mood and Affect: Mood normal.         Behavior: Behavior normal.         ED Course                 [facility]    -Fracture    Date/Time: 1/25/2024 5:54 AM    Performed by: Chucky Sung MD  Authorized by: Chucky Sung MD    Risks, benefits and alternatives discussed.    ED EVALUATION:      Assessment Time: 1/25/2024 2:40 AM      I have performed an Emergency Department Evaluation including taking a history and physical examination, this evaluation will be documented in the electronic medical record for this ED encounter.     Indication: L ankle fracture dislocation    ASA Class: Class 1- healthy patient    Mallampati: Grade 2- soft palate, base of uvula, tonsillar pillars, and portion of posterior pharyngeal wall visible    NPO Status: yes    UNIVERSAL PROTOCOL   Site Marked: Yes  Prior Images Obtained and Reviewed:  Yes  Required items: Required blood products, implants, devices and special equipment available    Patient identity confirmed:  Hospital-assigned identification number  Patient was reevaluated immediately before administering moderate or deep sedation or anesthesia  Confirmation Checklist:  Patient's identity using two indicators and procedure was appropriate and matched the consent  or emergent situation  Time out: Immediately prior to the procedure a time out was called    Universal Protocol: the Joint Commission Universal Protocol was followed    Preparation: Patient was prepped and draped in usual sterile fashion    ESBL (mL):  0    INJURY      Injury location:  Ankle    Ankle injury location:  L ankle    Ankle fracture type: trimalleolar      PRE PROCEDURE ASSESSMENT      Neurological function: normal      Distal perfusion: normal      Range of motion: reduced      SEDATION  Patient Sedated: Yes    Sedation Type:  Moderate (conscious) sedation  Sedation:  Ketamine  Vital signs: Vital signs monitored during sedation      ANESTHESIA (see MAR for exact dosages)      Anesthesia method:  None    PROCEDURE DETAILS:     Manipulation performed: yes      Skin traction used: yes      Reduction successful: markedly improved.      X-ray confirmed reduction: yes      Immobilization:  Splint    Splint type:  Ankle stirrup    Supplies used:  Ortho-Glass    POST PROCEDURE ASSESSMENT      Neurological function: normal      Distal perfusion: normal      Range of motion: unchanged        PROCEDURE    Patient Tolerance:  Patient tolerated the procedure well with no immediate complications  Length of time physician/provider present for 1:1 monitoring during sedation: 15               Critical Care time:               Results for orders placed or performed during the hospital encounter of 01/25/24 (from the past 24 hour(s))   CBC with platelets differential    Narrative    The following orders were created for panel order CBC with platelets differential.  Procedure                               Abnormality         Status                     ---------                               -----------         ------                     CBC with platelets and d...[[id number]]  Abnormal            Final result                 Please view results for these tests on the individual orders.   INR   Result Value Ref Range    INR  1.05 0.85 - 1.15   Comprehensive metabolic panel   Result Value Ref Range    Sodium 136 135 - 145 mmol/L    Potassium 3.5 3.4 - 5.3 mmol/L    Carbon Dioxide (CO2) 20 (L) 22 - 29 mmol/L    Anion Gap 17 (H) 7 - 15 mmol/L    Urea Nitrogen 15.4 6.0 - 20.0 mg/dL    Creatinine 0.90 0.67 - 1.17 mg/dL    GFR Estimate >90 >60 mL/min/1.73m2    Calcium 8.9 8.6 - 10.0 mg/dL    Chloride 99 98 - 107 mmol/L    Glucose 139 (H) 70 - 99 mg/dL    Alkaline Phosphatase 54 40 - 150 U/L     (H) 0 - 45 U/L     (H) 0 - 70 U/L    Protein Total 7.0 6.4 - 8.3 g/dL    Albumin 4.4 3.5 - 5.2 g/dL    Bilirubin Total 0.6 <=1.2 mg/dL   CBC with platelets and differential   Result Value Ref Range    WBC Count 7.4 4.0 - 11.0 10e3/uL    RBC Count 4.43 4.40 - 5.90 10e6/uL    Hemoglobin 15.5 13.3 - 17.7 g/dL    Hematocrit 42.6 40.0 - 53.0 %    MCV 96 78 - 100 fL    MCH 35.0 (H) 26.5 - 33.0 pg    MCHC 36.4 31.5 - 36.5 g/dL    RDW 11.7 10.0 - 15.0 %    Platelet Count 181 150 - 450 10e3/uL    % Neutrophils 58 %    % Lymphocytes 26 %    % Monocytes 10 %    % Eosinophils 4 %    % Basophils 1 %    % Immature Granulocytes 1 %    NRBCs per 100 WBC 0 <1 /100    Absolute Neutrophils 4.3 1.6 - 8.3 10e3/uL    Absolute Lymphocytes 1.9 0.8 - 5.3 10e3/uL    Absolute Monocytes 0.8 0.0 - 1.3 10e3/uL    Absolute Eosinophils 0.3 0.0 - 0.7 10e3/uL    Absolute Basophils 0.1 0.0 - 0.2 10e3/uL    Absolute Immature Granulocytes 0.1 <=0.4 10e3/uL    Absolute NRBCs 0.0 10e3/uL   Extra Tube    Narrative    The following orders were created for panel order Extra Tube.  Procedure                               Abnormality         Status                     ---------                               -----------         ------                     Extra Blue Top Tube[[id number]]                                                         Extra Red Top Tube[[id number]]                               In process                 Extra Green Top (Lithium...[[id number]]                                                  Extra Purple Top Tube[[id number]]                                                         Please view results for these tests on the individual orders.       Medications   fentaNYL (PF) (SUBLIMAZE) injection 100 mcg (100 mcg Intravenous $Given 1/25/24 0208)   ketamine (KETALAR) injection 110 mg (110 mg Intravenous $Given 1/25/24 0231)   HYDROmorphone (DILAUDID) injection 1 mg (1 mg Intravenous $Given 1/25/24 0309)   ceFAZolin Sodium (ANCEF) injection 2 g (2 g Intravenous $Given 1/25/24 0347)   HYDROmorphone (DILAUDID) injection 1 mg (1 mg Intravenous $Given 1/25/24 0423)   ketorolac (TORADOL) injection 15 mg (15 mg Intravenous $Given 1/25/24 0514)   ketamine (KETALAR) injection 28 mg (28 mg Intravenous $Given 1/25/24 0551)   sodium chloride 0.9 % infusion (0 mLs  Stopped 1/25/24 0618)       Assessments & Plan (with Medical Decision Making)     I have reviewed the nursing notes.    I have reviewed the findings, diagnosis, plan and need for follow up with the patient.          Medical Decision Making  The patient's presentation was of high complexity (an acute health issue posing potential threat to life or bodily function).    The patient's evaluation involved:  ordering and/or review of 3+ test(s) in this encounter (see separate area of note for details)  discussion of management or test interpretation with another health professional (ortho at [facility])    The patient's management necessitated high risk (a parenteral controlled substance), high risk (drug therapy requiring intensive monitoring (ketamine)), high risk (a decision regarding emergency major procedure (went to the OR with operative service)), and high risk (a decision regarding hospitalization).    33-year-old male here with fracture dislocation of left ankle.  Reduction done, incomplete however markedly improved from original.  Case discussed with [facility] who recommends optimizing reduction if markedly unsuccessful, as  well as transfer.  I am not 100% sure the patient has a open fracture.  It could be an abrasion however when I palpated it, it was a point of maximal bony tenderness and there are no other injuries to that part of the foot so I have a high enough suspicion to justify transfer for orthopedic consultation.  Also, other fractures noted, specifically bone fragments within the tibial talar joint space.  Patient has required multiple doses of IV narcotics so his reduction may need to be redone by orthopedics to maximize pain control prior to discharge.  Has received multiple doses of IV narcotics, Toradol, and a ketamine bolus.    There is a delay in transferring as [place] are completely iced over and ambulance service is waiting until they become more clear.  Patient not unstable enough to justify flying.    New Prescriptions    No medications on file       Final diagnoses:   Ankle dislocation, left, initial encounter   Type III open displaced fracture of medial malleolus of left tibia, initial encounter   Closed displaced comminuted fracture of shaft of left fibula, initial encounter       1/25/2024   [facility]       Chucky Sung MD  01/25/24 [id number]

## 2024-07-23 ENCOUNTER — TRANSFERRED RECORDS (OUTPATIENT)
Dept: HEALTH INFORMATION MANAGEMENT | Facility: CLINIC | Age: 34
End: 2024-07-23

## 2024-08-02 ENCOUNTER — TRANSCRIBE ORDERS (OUTPATIENT)
Dept: OTHER | Age: 34
End: 2024-08-02

## 2024-08-02 DIAGNOSIS — L60.0 INGROWING NAIL: Primary | ICD-10-CM

## 2024-08-22 ENCOUNTER — OFFICE VISIT (OUTPATIENT)
Dept: PODIATRY | Facility: OTHER | Age: 34
End: 2024-08-22
Attending: PODIATRIST
Payer: OTHER GOVERNMENT

## 2024-08-22 VITALS
OXYGEN SATURATION: 96 % | TEMPERATURE: 96.5 F | HEART RATE: 96 BPM | DIASTOLIC BLOOD PRESSURE: 90 MMHG | SYSTOLIC BLOOD PRESSURE: 120 MMHG

## 2024-08-22 DIAGNOSIS — L60.0 INGROWN TOENAIL: Primary | ICD-10-CM

## 2024-08-22 PROCEDURE — 11750 EXCISION NAIL&NAIL MATRIX: CPT | Mod: TA | Performed by: PODIATRIST

## 2024-08-22 ASSESSMENT — PAIN SCALES - GENERAL: PAINLEVEL: NO PAIN (0)

## 2024-08-22 NOTE — PROGRESS NOTES
Chief complaint: Patient presents with:  Ingrown Toenail: Bilateral great toes      History of Present Illness: This 33 year old male is seen at the request of No ref. provider found for evaluation and suggestions of management of ingrown toenails on both great toes.    He has had increased pain from the LEFT hallux the last two months. He had drainage from the nail borders a month ago on the LEFT hallux. He cleaned it and trimmed it back and it improved, but it is still painful. The RIGHT hallux toenail has been sore in the past.    No further pedal complaints today.      BP (!) 120/90 (BP Location: Left arm, Patient Position: Sitting, Cuff Size: Adult Large)   Pulse 96   Temp (!) 96.5  F (35.8  C) (Tympanic)   SpO2 96%     Patient Active Problem List   Diagnosis    Anal fistula    PTSD (post-traumatic stress disorder)    Alcohol dependence in remission (H)    Paranoia (H)    Chronic low back pain    Idiopathic peripheral neuropathy    Chronic pain    Deferred diagnosis on axis I    Encounter for occupational therapy    Finding of above normal blood pressure    Major depressive disorder    Alcohol abuse    Alcohol dependence (H)    Altered mental status, unspecified    Bipolar affective disorder, currently manic, severe, with psychotic features (H)    Bipolar disorder, most recent episode manic (H)    Bipolar disorder, in full remission, most recent episode manic (H24)    Blister    Breathing-related sleep disorder    Sleep apnea    Cough    Nasal discharge    Noncompliance with treatment regimen    Nondependent cannabis abuse in remission    Palpitations    Periapical abscess without sinus    Pilonidal cyst without abscess    Positive reaction to tuberculin skin test    Red eyes    Muscle pain    Myofascial pain syndrome    Segmental dysfunction    Sensory hearing loss, unilateral    Sinus bradycardia    Tinnitus    Substance abuse (H)    Throat pain    Unemployed    Alcohol dependence, episodic (H)    Anxiety  disorder    Bitten or stung by nonvenomous insect and other nonvenomous arthropods, initial encounter    Post-traumatic stress disorder    Psychosis (H)       Past Surgical History:   Procedure Laterality Date    CYSTECTOMY PILONIDAL N/A 9/12/2023    Procedure: Pilonidal cyst debridement;  Surgeon: Chan Campbell MD;  Location: [facility]    EXAM UNDER ANESTHESIA RECTUM N/A 9/7/2021    Procedure: EXAM UNDER ANESTHESIA, I&D of Abssess;  Surgeon: Kale Cruz MD;  Location: [facility]       Current Outpatient Medications   Medication Sig Dispense Refill    OLANZapine (ZYPREXA) 20 MG tablet Take 10 mg by mouth 2 times daily (Patient not taking: Reported on 8/22/2024)       No current facility-administered medications for this visit.          Allergies   Allergen Reactions    Penicillin G     Shellfish Allergy     Shellfish-Derived Products     Shrimp Other (See Comments)    Penicillins Unknown, Rash and Dermatitis       Family History   Problem Relation Age of Onset    Bladder Cancer Mother     Throat cancer Father        Social History     Socioeconomic History    Marital status: Legally    Tobacco Use    Smoking status: Every Day     Current packs/day: 1.00     Average packs/day: 1 pack/day for 5.9 years (5.9 ttl pk-yrs)     Types: Cigarettes     Start date: 9/20/2018    Smokeless tobacco: Former     Quit date: 9/20/2018    Tobacco comments:     declines quitplan referral 9/21/2021   Substance and Sexual Activity    Alcohol use: Yes     Comment: few times a week    Drug use: Yes     Types: Marijuana     Comment: few times a week     Social Determinants of Health     Financial Resource Strain: Low Risk  (1/26/2024)    Received from [facility]    Overall Financial Resource Strain (CARDIA)     Difficulty of Paying Living Expenses: Not hard at all   Food Insecurity: No Food Insecurity (1/26/2024)    [facility]    Hunger Vital  Sign     Worried About Running Out of Food in the Last Year: Never true     Ran Out of Food in the Last Year: Never true   Transportation Needs: No Transportation Needs (1/26/2024)    Received from [facility] [employer] [facility]    PRAPARE - Transportation     Lack of Transportation (Medical): No     Lack of Transportation (Non-Medical): No   Interpersonal Safety: Not At Risk (1/25/2024)    Received from [facility] Custom IPV     Do you feel UNSAFE in any of your personal relationships with your family members or any other acquaintances?: No   Housing Stability: Low Risk  (1/26/2024)    Received from [facility] IP Housing Domain     Retired - What is your living situation today? : I have a steady place to live       ROS: 10 point ROS neg other than the symptoms noted above in the HPI.  EXAM  Constitutional: healthy, alert, and no distress    Psychiatric: mentation appears normal and affect normal/bright    VASCULAR:  -Dorsalis pedis pulse +2/4 b/l  -Posterior tibial pulse +2/4 b/l  -Capillary refill time < 3 seconds to b/l hallux  -Hair growth Present to b/l anterior legs and ankles  NEURO:  -Light touch sensation intact to b/l plantar forefoot  DERM:  -Skin temperature, texture and turgor WNL b/l  -Incurvation to the bilateral toenail border of the bilateral hallux (LEFT more than RIGHT)  ---Mild erythema and no edema to the nail border  ---No open wounds and no drainage  ---No severe erythema, no ascending erythema, no calor, no purulence, no malodor, no other SOI.  MSK:  -Pain on palpation to the bilateral toenail border of the LEFT hallux  -Muscle strength of ankles +5/5 for dorsiflexion, plantarflexion, ABDUction and ADDuction b/l    ============================================================    ASSESSMENT:  (L60.0) Ingrown toenail  (primary encounter diagnosis)        PLAN:  -Patient evaluated and examined.  Treatment options discussed with no educational barriers noted.  Ingrown toenail(s):  -Discussed nail procedure options and etiologies and treatments for ingrown toenails. Conservatively, patient could opt for a slant back today and keep monitoring the toe since there are no SOI. Discussed risks and benefits and healing course of a nail border avulsion vs. Matrixectomy including post procedure infection or a non-healing wound, both of which could lead to a severe infection. Patient understands the risks and benefits and has decided to proceed with a LEFT hallux bilateral toenail border matrixectomy.  He does not want to address his RIGHT great toe today because it has not recently caused him pain.    -Matrixectomy of left hallux bilateral toenail border: Written and verbal consent obtained after reviewing risks and benefits of the procedure. Patient understands that although phenol is used in attempt to prevent regrowth of the ingrown toenail, the nail can still grow back. There is also a risk of post procedure infection. A severe foot infection could lead to a proximal foot or leg amputation or loss of life, so the patient is advised to return to podiatry or the ED immediately if the patient notices any SOI. The patient is in agreement with this plan and wishes to proceed with the procedure. A time-out was performed to identify the correct patient, limb, digit and procedure.    An alcohol prep pad was applied to  to the base of the left hallux. The digit was injected with 6 mL of 1:1 of 2% Lidocaine plain and 0.5% marcaine plain in a ring block fashion at the base of the toe(s). Adequate local anesthesia was obtained. A ring tournicot was applied to the digit and a chloroprep was applied to the hallux. A freer was used to loosen the nail from the underlying nail bed. An English Anvil and a hemostat were then used to remove the bilateral toenail border. A total of three applications of phenol were applied for 30  "seconds per application per border. The digit was rinsed thoroughly with alcohol. The tournicot was removed from the toe and there was a prompt hyperemic response to the hallux. The wound was then dressed with an Silvadene, gauze and 1\" coban. The patient was educated on after procedure care including daily epsom salt soaks starting tomorrow followed by dressing of the toe with an antibiotic cream and a bandaid until the wound site on the toe stops draining (2-3 weeks). Provided education on how to look for signs of infection (redness, swelling, pain, purulence, fever, chills, nausea, vomiting) and the patient was instructed to return to the clinic or Emergency Department immediately if there are any signs of infection.    -Patient in agreement with the above treatment plan and all of patient's questions were answered.      Return to clinic as needed        Kristen Pearl DPM  "

## 2024-08-22 NOTE — PATIENT INSTRUCTIONS
Nail procedure care:  -Start epsom salt soaks tomorrow. Soak the foot 1-2 times a day for 20 minutes.  -Apply an antibiotic cream, gauze and a bandaid over the toe for the first week after the procedure.  -After one week, continue the epsom salt soaks, but switch to a betadine dressing. Apply a small amount of betadine on gauze or dab the betadine over the toe with gauze and apply another dry gauze over the toe followed by a band aid or tape.  -Do not apply a band aid directly over the nail procedure site without gauze or it will trap too much moisture beneath the band aid.  Note: Continue the epsom salt soaks and dressings every day until the wound is fully healed. You should not see drainage on the bandage for at least two days in a row. Call the clinic if the wound is still moderately draining two weeks after the procedure.    Keep the toe covered at all times until it is completely healed.  -You may develop a black scab over the nail bed--let this fall off on its own and don't pick at it.  -The toe may drain for 2-3 weeks. It is normal for it to have a clear drainage.    Watching for signs of infection:  If the toe has a thick, white pus coming from the procedure site or if the the toe becomes red, swollen, painful, or you begin to feel sick (fever/chills/nausea/vomitting), return to the podiatry clinic immediately or to the Emergency Department room if after hours.      Podiatry can be reached directly at [phone]. Leave a voicemail if there is not an answer.

## (undated) DEVICE — LIGHT HANDLE COVER FOR SKYTRON LIGHTS

## (undated) DEVICE — LIGASURE-EXACT DISSECTOR

## (undated) DEVICE — DRSG GAUZE 4X4" 3033

## (undated) DEVICE — DRSG-KERLIX 6 X 6 3/4 FLUFF

## (undated) DEVICE — ESU GROUND PAD ADULT W/CORD E7507

## (undated) DEVICE — SUTURE-VICRYL 3-0 SH J416H

## (undated) DEVICE — SCD SLEEVE-KNEE REG.

## (undated) DEVICE — PACK-BASIN SET-UP

## (undated) DEVICE — SOL WATER IRRIG 1000ML BOTTLE 2F7114

## (undated) DEVICE — PANTIES MESH LG/XLG 2PK 706M2

## (undated) DEVICE — DRSG-ABDOMINAL 8 X 10

## (undated) DEVICE — SLEEVE SCD EXPRESS KNEE LENGTH MED 9529

## (undated) DEVICE — GLV-8.0 PROTEXIS PI CLASSIC LF/PF

## (undated) DEVICE — LABEL-STERILE PREPRINTED FOR OR

## (undated) DEVICE — DRSG KERLIX SUPER SPONGE 6X6.75" 2585

## (undated) DEVICE — CAUTERY MICROFINE NEEDLE 0118

## (undated) DEVICE — PACK LAPAROTOMY CUSTOM SBA32LPMBG

## (undated) DEVICE — SU VICRYL 3-0 CT-2 27" UND J232H

## (undated) DEVICE — TUBING-SUCTION 20FT

## (undated) DEVICE — SOL NACL 0.9% IRRIG 1000ML BOTTLE 2F7124

## (undated) DEVICE — TRAY-SKIN PREP POVIDONE/IODINE

## (undated) DEVICE — GOWN-SURG XL LVL 3 REINFORCED

## (undated) DEVICE — SU VICRYL 3-0 SH 27" UND J416H

## (undated) DEVICE — LABEL STERILE PREPRINTED FOR OR FRRH01-2M

## (undated) DEVICE — PACK BASIN SET UP SUTCNBSBBA

## (undated) DEVICE — PACK-LAPAROTOMY-CUSTOM

## (undated) DEVICE — GOWN SURG XL LVL 3 REINFORCED 9541

## (undated) DEVICE — CANISTER SUCTION MEDI-VAC GUARDIAN 2000ML 90D 65651-220

## (undated) DEVICE — COVER LT HANDLE 2/PK 5160-2FG

## (undated) DEVICE — CAUTERY PAD-POLYHESIVE II ADULT

## (undated) DEVICE — GLV 7.5 BIOGEL LATEX 30475-01

## (undated) DEVICE — TRAY SKIN PREP POVIDONE/IODINE DYND70372

## (undated) DEVICE — IRRIGATION-H2O 1000ML

## (undated) DEVICE — SUCTION TUBE-YANKAUR

## (undated) DEVICE — DRSG STERI STRIP 1/2X4" R1547

## (undated) DEVICE — PANTIES-MESH L/XL

## (undated) DEVICE — SYRINGE-ASEPTO IRRIGATION

## (undated) DEVICE — APPLICATOR BNZN TNCT RYN SWBSTK NWVN SNGL APLS1106

## (undated) DEVICE — SU VICRYL 2-0 CT-2 27" UND J269H

## (undated) RX ORDER — LIDOCAINE HYDROCHLORIDE 20 MG/ML
INJECTION, SOLUTION EPIDURAL; INFILTRATION; INTRACAUDAL; PERINEURAL
Status: DISPENSED
Start: 2021-09-07

## (undated) RX ORDER — FENTANYL CITRATE 50 UG/ML
INJECTION, SOLUTION INTRAMUSCULAR; INTRAVENOUS
Status: DISPENSED
Start: 2023-09-12

## (undated) RX ORDER — KETOROLAC TROMETHAMINE 30 MG/ML
INJECTION, SOLUTION INTRAMUSCULAR; INTRAVENOUS
Status: DISPENSED
Start: 2021-09-07

## (undated) RX ORDER — DEXMEDETOMIDINE HYDROCHLORIDE 100 UG/ML
INJECTION, SOLUTION INTRAVENOUS
Status: DISPENSED
Start: 2023-09-12

## (undated) RX ORDER — FENTANYL CITRATE 50 UG/ML
INJECTION, SOLUTION INTRAMUSCULAR; INTRAVENOUS
Status: DISPENSED
Start: 2021-09-07

## (undated) RX ORDER — ONDANSETRON 2 MG/ML
INJECTION INTRAMUSCULAR; INTRAVENOUS
Status: DISPENSED
Start: 2023-09-12

## (undated) RX ORDER — ONDANSETRON 2 MG/ML
INJECTION INTRAMUSCULAR; INTRAVENOUS
Status: DISPENSED
Start: 2021-09-07

## (undated) RX ORDER — PROPOFOL 10 MG/ML
INJECTION, EMULSION INTRAVENOUS
Status: DISPENSED
Start: 2021-09-07

## (undated) RX ORDER — GLYCOPYRROLATE 0.2 MG/ML
INJECTION, SOLUTION INTRAMUSCULAR; INTRAVENOUS
Status: DISPENSED
Start: 2023-09-12

## (undated) RX ORDER — DEXAMETHASONE SODIUM PHOSPHATE 10 MG/ML
INJECTION, SOLUTION INTRAMUSCULAR; INTRAVENOUS
Status: DISPENSED
Start: 2023-09-12

## (undated) RX ORDER — DEXAMETHASONE SODIUM PHOSPHATE 10 MG/ML
INJECTION, SOLUTION INTRAMUSCULAR; INTRAVENOUS
Status: DISPENSED
Start: 2021-09-07

## (undated) RX ORDER — PROPOFOL 10 MG/ML
INJECTION, EMULSION INTRAVENOUS
Status: DISPENSED
Start: 2023-09-12